# Patient Record
Sex: FEMALE | Race: WHITE | Employment: OTHER | URBAN - METROPOLITAN AREA
[De-identification: names, ages, dates, MRNs, and addresses within clinical notes are randomized per-mention and may not be internally consistent; named-entity substitution may affect disease eponyms.]

---

## 2017-06-28 ENCOUNTER — OFFICE VISIT (OUTPATIENT)
Dept: OPHTHALMOLOGY | Facility: CLINIC | Age: 77
End: 2017-06-28
Attending: OPHTHALMOLOGY
Payer: MEDICARE

## 2017-06-28 DIAGNOSIS — H27.8: ICD-10-CM

## 2017-06-28 DIAGNOSIS — H40.059 BORDERLINE GLAUCOMA WITH OCULAR HYPERTENSION: Primary | ICD-10-CM

## 2017-06-28 PROCEDURE — 92133 CPTRZD OPH DX IMG PST SGM ON: CPT | Mod: ZF | Performed by: OPHTHALMOLOGY

## 2017-06-28 PROCEDURE — 92250 FUNDUS PHOTOGRAPHY W/I&R: CPT | Mod: ZF | Performed by: OPHTHALMOLOGY

## 2017-06-28 PROCEDURE — 99213 OFFICE O/P EST LOW 20 MIN: CPT | Mod: ZF

## 2017-06-28 RX ORDER — LATANOPROST 50 UG/ML
1 SOLUTION/ DROPS OPHTHALMIC AT BEDTIME
Qty: 1 BOTTLE | Refills: 11 | Status: SHIPPED | OUTPATIENT
Start: 2017-06-28 | End: 2018-08-09

## 2017-06-28 ASSESSMENT — TONOMETRY
OD_IOP_MMHG: 25
OS_IOP_MMHG: 30
IOP_METHOD: APPLANATION

## 2017-06-28 ASSESSMENT — VISUAL ACUITY
METHOD: SNELLEN - LINEAR
OS_CC: 20/20
OD_CC: 20/15
CORRECTION_TYPE: GLASSES
OS_CC+: -1

## 2017-06-28 ASSESSMENT — CONF VISUAL FIELD
METHOD: COUNTING FINGERS
OS_NORMAL: 1
OD_NORMAL: 1

## 2017-06-28 ASSESSMENT — SLIT LAMP EXAM - LIDS
COMMENTS: NORMAL
COMMENTS: NORMAL

## 2017-06-28 ASSESSMENT — CUP TO DISC RATIO
OD_RATIO: 0.6
OS_RATIO: 0.6

## 2017-06-28 NOTE — NURSING NOTE
Chief Complaints and History of Present Illnesses   Patient presents with     Follow Up For     Pseudoexfoliation     HPI    Affected eye(s):  Both   Symptoms:     No blurred vision   No decreased vision         Do you have eye pain now?:  No      Comments:  Dionna JALLOH 7:56 AM June 28, 2017

## 2017-06-28 NOTE — PROGRESS NOTES
Pseudoexfoliation  Ocular hypertension-intraocular pressure fluctuates   Previously nl visual field and OCT  Cataracts-not symptomatic    HPI:  Selected laser trabeculoplasty (SLT) right eye (4/1/16)  No visual changes noted    Getting yearly visual field  alternating with yearly OCT    Testing today  Disc photos stable compared to 2012 both eyes     OCT retinal nerve fiber layer    Right eye: inferotemporal thinning and general thinning.     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, if latanoprost would use both eyes) - Prefers not to be on medications  Retired pharmacist    Impression  S/p Selected laser trabeculoplasty (SLT) right eye 4/1/16   intraocular pressure stable today in low 20s s/p Selected laser trabeculoplasty (SLT)    Early Pseudoexfoliation glaucoma right eye, 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 I personally obtained the chief complaint(s) and history of present illness. I confirmed and edited asnecessary the review of systems, past medical/surgical history, family history, social history, and examination findings as documented by others; and I examined the patient myself. I personally reviewed the relevant tests, images, and reports as documented above. I formulated and edited as necessary the assessment and plan  and discussed the findings and management plan with the patient and family.  - Rhiannon Christy MD 8:35 AM 6/28/2017

## 2017-06-28 NOTE — MR AVS SNAPSHOT
After Visit Summary   6/28/2017    Johnna Hurtado    MRN: [id number]           Patient Information     Date Of Birth          1940        Visit Information        Provider Department      6/28/2017 7:45 AM Rhiannon Christy MD [facility]        Today's Diagnoses     Borderline glaucoma with ocular hypertension    -  1    Exfoliation of lens capsule           Follow-ups after your visit        Follow-up notes from your care team     Return in about 7 weeks (around 8/16/2017) for iop check in 6-8 weeks.      Your next 10 appointments already scheduled     Aug 16, 2017  7:30 AM CDT   RETURN GLAUCOMA with Rhiannon Christy MD   Eye Clinic ([facility])    [address]  [address]  [address]  [facility] [address] [phone]              Who to contact     Please call your clinic at [phone] to:    Ask questions about your health    Make or cancel appointments    Discuss your medicines    Learn about your test results    Speak to your doctor   If you have compliments or concerns about an experience at your clinic, or if you wish to file a complaint, please contact [facility] Patient Relations at [phone] or email us at [email or web address]         Additional Information About Your Visit        MyChart Information     [employer] gives you secure access to your electronic health record. If you see a primary care provider, you can also send messages to your care team and make appointments. If you have questions, please call your primary care clinic.  If you do not have a primary care provider, please call [phone] and they will assist you.      [employer] is an electronic gateway that provides easy, online access to your medical records. With [employer], you can request a clinic appointment, read your test results, renew a prescription or communicate with your care team.     To access your existing account, please contact your  [facility] or call [phone] for assistance.        Care EveryWhere ID     This is your Care EveryWhere ID. This could be used by other organizations to access your [facility] medical records  [id number]         Blood Pressure from Last 3 Encounters:   10/27/16 108/75   09/23/15 106/72   08/22/14 113/80    Weight from Last 3 Encounters:   10/27/16 70.3 kg (155 lb)   09/23/15 69 kg (152 lb 1.3 oz)   08/22/14 70.8 kg (156 lb)              We Performed the Following     Fundus Photos OU (both eyes)     OCT Optic Nerve RNFL Spectralis OU (both eyes)          Today's Medication Changes          These changes are accurate as of: 6/28/17  8:57 AM.  If you have any questions, ask your nurse or doctor.               Start taking these medicines.        Dose/Directions    latanoprost 0.005 % ophthalmic solution   Commonly known as:  XALATAN   Used for:  Exfoliation of lens capsule   Started by:  Rhiannon Christy MD        Dose:  1 drop   Place 1 drop into both eyes At Bedtime   Quantity:  1 Bottle   Refills:  11            Where to get your medicines      These medications were sent to [address] [address]     Phone:  [phone]     latanoprost 0.005 % ophthalmic solution                Primary Care Provider Office Phone # Fax #    Dominic Brad Hopkins [phone]       [address] [address]        Equal Access to Services     FARZANEH MOROCHO AH: Hadii myron ku hadasho Soomaali, waaxda luqadaha, qaybta kaalmada yuriegyada, waxpierce melissa bianchi. So [facility] [phone].    ATENCIÓN: Si nicolasala rohith, tiene a thomason disposición servicios gratuitos de asistencia lingüística. Llame al [phone].    We comply with applicable federal civil rights laws and [place] laws. We do not discriminate on the basis of race, color, national origin, age, disability sex,  sexual orientation or gender identity.            Thank you!     Thank you for choosing EYE CLINIC  for your care. Our goal is always to provide you with excellent care. Hearing back from our patients is one way we can continue to improve our services. Please take a few minutes to complete the written survey that you may receive in the mail after your visit with us. Thank you!             Your Updated Medication List - Protect others around you: Learn how to safely use, store and throw away your medicines at www.disposemymeds.org.          This list is accurate as of: 6/28/17  8:57 AM.  Always use your most recent med list.                   Brand Name Dispense Instructions for use Diagnosis    latanoprost 0.005 % ophthalmic solution    XALATAN    1 Bottle    Place 1 drop into both eyes At Bedtime    Exfoliation of lens capsule       UNABLE TO FIND      MEDICATION NAME: Cosmos, cocoa supplement and multivitamin studies., Take 2 pills of cocoa extract and 1 multivitamins.

## 2017-08-16 ENCOUNTER — OFFICE VISIT (OUTPATIENT)
Dept: OPHTHALMOLOGY | Facility: CLINIC | Age: 77
End: 2017-08-16
Attending: OPHTHALMOLOGY
Payer: MEDICARE

## 2017-08-16 DIAGNOSIS — H40.052 BORDERLINE GLAUCOMA WITH OCULAR HYPERTENSION, LEFT: Primary | ICD-10-CM

## 2017-08-16 DIAGNOSIS — H40.1412: ICD-10-CM

## 2017-08-16 DIAGNOSIS — H26.8 PXF (PSEUDOEXFOLIATION OF LENS CAPSULE): ICD-10-CM

## 2017-08-16 PROCEDURE — 99213 OFFICE O/P EST LOW 20 MIN: CPT | Mod: ZF

## 2017-08-16 ASSESSMENT — REFRACTION_WEARINGRX
OS_SPHERE: -3.00
OD_CYLINDER: +1.00
OS_AXIS: 006
OS_CYLINDER: +1.00
OD_ADD: +2.50
OD_SPHERE: -3.25
OS_ADD: +2.50
OD_AXIS: 025

## 2017-08-16 ASSESSMENT — TONOMETRY
OS_IOP_MMHG: 23
OD_IOP_MMHG: 19
OD_IOP_MMHG: 23
IOP_METHOD: APPLANATION
IOP_METHOD: APPLANATION
OS_IOP_MMHG: 21

## 2017-08-16 ASSESSMENT — CUP TO DISC RATIO
OS_RATIO: 0.6
OD_RATIO: 0.6

## 2017-08-16 ASSESSMENT — SLIT LAMP EXAM - LIDS
COMMENTS: NORMAL
COMMENTS: NORMAL

## 2017-08-16 ASSESSMENT — CONF VISUAL FIELD
METHOD: COUNTING FINGERS
OD_NORMAL: 1
OS_NORMAL: 1

## 2017-08-16 ASSESSMENT — VISUAL ACUITY
CORRECTION_TYPE: GLASSES
OD_CC: 20/15
METHOD: SNELLEN - LINEAR
OS_CC: 20/20

## 2017-08-16 NOTE — MR AVS SNAPSHOT
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      Your next 10 appointments already scheduled     Dec 15, 2017  9:45 AM CST   VISUAL FIELD with [facility] EYE VISUAL FIELD   Eye Clinic ([facility])    [address]  [address]   [address] [address] [place] [address]   [phone]            Dec 15, 2017 10:15 AM CST   RETURN GLAUCOMA with Rhiannon Christy MD   Eye Clinic ([facility])    [address]  [address]   [address] [address]  [facility] [address]   [phone]              Who to contact     Please call your clinic at [phone] to:    Ask questions about your health    Make or cancel appointments    Discuss your medicines    Learn about your test results    Speak to your doctor   If you have compliments or concerns about an experience at your clinic, or if you wish to file a complaint, please contact [facility] Patient Relations at [phone] or email us at [email or web address]         Additional Information About Your Visit        MyChart Information     [employer] gives you secure access to your electronic health record. If you see a primary care provider, you can also send messages to your care team and make appointments. If you have questions, please call your primary care clinic.  If you do not have a  primary care provider, please call [phone] and they will assist you.      [employer] is an electronic gateway that provides easy, online access to your medical records. With [employer], you can request a clinic appointment, read your test results, renew a prescription or communicate with your care team.     To access your existing account, please contact your [facility] or call [phone] for assistance.        Care EveryWhere ID     This is your Care EveryWhere ID. This could be used by other organizations to access your [facility] medical records  [id number]         Blood Pressure from Last 3 Encounters:   10/27/16 108/75   09/23/15 106/72   08/22/14 113/80    Weight from Last 3 Encounters:   10/27/16 70.3 kg (155 lb)   09/23/15 69 kg (152 lb 1.3 oz)   08/22/14 70.8 kg (156 lb)              Today, you had the following     No orders found for display       Primary Care Provider Office Phone # Fax #    Dominic Hopkins [phone]       [address] [place] [address]        Equal Access to Services     [facility] Hospital[facility] : Hadii myron dempsey hadasho Soomaali, waaxda luqadaha, qaybta kaalmada adegeorgetteyaari, isela wade . So [facility] [phone].    ATENCIÓN: Si habla español, tiene a thomason disposición servicios gratuitos de asistencia lingüística. [facility] [phone].    We comply with applicable federal civil rights laws and [place] laws. We do not discriminate on the basis of race, color, national origin, age, disability sex, sexual orientation or gender identity.            Thank you!     Thank you for choosing EYE CLINIC  for your care. Our goal is always to provide you with excellent care. Hearing back from our patients is one way we can continue to improve our services. Please take a few minutes to complete the written survey that you may receive in the mail after your visit with us. Thank you!             Your Updated Medication List -  Protect others around you: Learn how to safely use, store and throw away your medicines at www.disposemymeds.org.          This list is accurate as of: 8/16/17  8:21 AM.  Always use your most recent med list.                   Brand Name Dispense Instructions for use Diagnosis    latanoprost 0.005 % ophthalmic solution    XALATAN    1 Bottle    Place 1 drop into both eyes At Bedtime    Exfoliation of lens capsule       UNABLE TO FIND      MEDICATION NAME: Cosmos, cocoa supplement and multivitamin studies., Take 2 pills of cocoa extract and 1 multivitamins.

## 2017-08-16 NOTE — NURSING NOTE
Chief Complaints and History of Present Illnesses   Patient presents with     Follow Up For     Borderline glaucoma with ocular hypertension      HPI    Affected eye(s):  Both   Symptoms:        Frequency:  Constant       Do you have eye pain now?:  No      Comments:  States va is the same since last visit  No red watery or dry  Florinda Hunter COT 7:35 AM August 16, 2017

## 2017-08-16 NOTE — PROGRESS NOTES
Pseudoexfoliation  Ocular hypertension-intraocular pressure fluctuates   Previously nl visual field and OCT  Cataracts-not symptomatic    HPI:  Selected laser trabeculoplasty (SLT) right eye (4/1/16)  No visual changes noted. Eyes comfortable on latanoprost.     Getting yearly visual field  alternating with yearly OCT    Previous testing  Disc photos stable compared to 2012 both eyes     OCT retinal nerve fiber layer 6/28/17   Right eye: inferotemporal thinning and general thinning.     There has been slow progression (thinning over 5 years) including worsening since selective laser trabeculoplasty    Left eye: Normal, stable    Exam  Thin area of disc IT right eye with continued presence of possible resolving Drance hemorrhage in that area - stable compared to 6/28    Has kelechi eyes, was started on latanoprost - Prefers not to be on medications  Retired pharmacist      Impression  S/p Selected laser trabeculoplasty (SLT) right eye 4/1/16   Intraocular pressure upper teens/low 20s today (IOP was 25/30 prior to starting latanoprost) s/p starting latanoprost both eyes 6/28/17 and s/p Selected laser trabeculoplasty (SLT)    Moderate Pseudoexfoliation glaucoma right eye, 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 I personally obtained the chief complaint(s) and history of present illness. I confirmed and edited asnecessary the review of systems, past medical/surgical history, family history, social history, and examination findings as documented by others; and I examined the patient myself. I personally reviewed the  relevant tests, images, and reports as documented above. I formulated and edited as necessary the assessment and plan and discussed the findings and management plan with the patient and family.  - Rhiannon Christy MD 8:10 AM 8/16/2017

## 2017-09-29 DIAGNOSIS — Z85.3 HISTORY OF BREAST CANCER: Primary | ICD-10-CM

## 2017-09-29 DIAGNOSIS — Z90.12 H/O MASTECTOMY, LEFT: ICD-10-CM

## 2017-12-12 NOTE — PROGRESS NOTES
CHIEF COMPLAINT:  Medicare annual wellness visit, subsequent.      HISTORY OF PRESENT ILLNESS:  Johnna is a 77-year-old here for the above.  Overall, she is doing very well.  She has 2 questions.      The first is that there is a small mole behind her right ear and one on her left chest wall.  Neither cause any pain or irritating.  The second question is regarding the new shingles vaccine and we had a good conversation about that.       ACTIVE MEDICAL PROBLEMS:  Reviewed.      CURRENT MEDICATIONS:  Consists only of latanoprost (Xalatan) drops as she has pre-glaucoma.  She is followed very closely every 6 months by Dr. Rhiannon Christy in Ophthalmology at the [facility].        FAMILY, SOCIAL AND PAST SURGICAL HISTORIES:  Unchanged.  From a social history standpoint, she has been  now for about 4-1/2 years and actually is enjoying life.  She grew up as an only child and is starting to enjoy her time alone.  She has a 9-year-old dog who she is very close to.  No changes with her family or past surgical histories.        MEDICARE QUESTIONS:  No problems at all with any activities of daily living.  She has had no falls at home in the last year.  No symptoms of depression in the last 2 weeks.  Absolutely no memory loss.  Of note, her son was concerned about her memory last year.  In hindsight, she feels that he was going through some things himself and wanted her to get checked out.  I administered a Mini-Mental Status Exam (MMSE) and her score was 30.  She had no concerns then and she has no concerns now about her memory.      HEALTHCARE MAINTENANCE:  She wears glasses and has pre-glaucoma as mentioned.  She sees Dr. Christy every 6 months.  Hearing reveals no changes.  Dental care is completely up to date.  Blood pressure 125/83.  Weight is up 7 pounds from the last time I saw her a little over a year ago.  She is doing more cooking at home and therefore is putting on some weight.  However, she is not that worried  about it and is happy.  BMI 31.95.  From an immunization standpoint, she had a flu shot this fall.  She would like to get the new shingles vaccine and I will recommend that in 2018 once it becomes available.  She understands it is a 2-shot sequence and will need to get that at her pharmacy as it is not covered under Medicare part B.  Up to date with a tetanus shot.  She had a mammogram of the right breast in 10/2017 (she had a left mastectomy due to breast cancer).  This was normal.  Colonoscopy was last performed in 2015 and they recommended a 5-year followup.  However, she will be 80 at that point so we will likely do a Cologuard test.  Lipid panel done a year ago was absolutely perfect.  Glucose is normal.  Therefore, she and I elected not to do any blood work this year as it is simply not necessary.      REVIEW OF SYSTEMS:  As above.      OBJECTIVE:  Johnna is in absolutely no distress.  Affect is upbeat.  She is completely alert and oriented x3.  /83 with a heart rate of 75.  Temperature is 98.  She is 4 feet 11.75 inches in height and weighs 163 with a BMI of 31.95.  O2 sats are 98% on room air.   HEENT:  Head is normocephalic, atraumatic.  Ears are free of any cerumen.  The TMs look fine.  There is no pain with palpation of the frontal or maxillary sinuses.  Eyes are grossly normal.  Nose is free of any congestion or discharge.  Teeth in good repair.  Mucous membranes are moist.  Throat looks benign.  Neck is supple without adenopathy or thyromegaly.  No carotid bruits are heard, no JVD.   BACK:  Smooth and straight.  No pain to percussion.  No CVA tenderness.     LUNGS:  Clear to auscultation bilaterally.     HEART:  Regular rate and rhythm without murmur.  Ankles are free of any significant edema.  She has excellent dorsalis pedis pulses bilaterally.     SKIN:  There is a bit of a blue nevus on her left anterior chest wall.  It looks benign.  It might just be a blood vessel.  We will simply keep an  eye on it at this point.  Behind her right ear is what appears to be a seborrheic keratosis.  It is a relatively light brown, raised slightly, clear border and nothing about it looks ominous.  We will simply watch that for now as well.      ASSESSMENT/PLAN:   1.  Medicare annual wellness visit, subsequent.   2.  Up to date with healthcare maintenance strategies.   3.  Shingles vaccine in 2018.   4.  History of breast cancer, up to date with breast cancer screening.   5.  Declining DEXA scan for bone density purposes as she has no interest in taking one of the bisphosphonate medications.   6.  Borderline glaucoma, followed closely by Ophthalmology.  Continue with current visits as she has been doing.  Call with any problems or questions.     Dominic Hopkins MD  [facility]

## 2017-12-14 DIAGNOSIS — H40.1412: Primary | ICD-10-CM

## 2017-12-15 ENCOUNTER — OFFICE VISIT (OUTPATIENT)
Dept: OPHTHALMOLOGY | Facility: CLINIC | Age: 77
End: 2017-12-15
Attending: OPHTHALMOLOGY
Payer: MEDICARE

## 2017-12-15 DIAGNOSIS — H40.1412: ICD-10-CM

## 2017-12-15 PROCEDURE — 99213 OFFICE O/P EST LOW 20 MIN: CPT | Mod: ZF

## 2017-12-15 PROCEDURE — 92083 EXTENDED VISUAL FIELD XM: CPT | Mod: ZF | Performed by: OPHTHALMOLOGY

## 2017-12-15 ASSESSMENT — REFRACTION_WEARINGRX
OD_CYLINDER: +1.00
OD_ADD: +2.50
OD_AXIS: 025
OS_ADD: +2.50
OS_AXIS: 006
OS_CYLINDER: +1.00
OD_SPHERE: -3.25
OS_SPHERE: -3.00

## 2017-12-15 ASSESSMENT — CONF VISUAL FIELD
OD_NORMAL: 1
OS_NORMAL: 1
METHOD: COUNTING FINGERS

## 2017-12-15 ASSESSMENT — VISUAL ACUITY
OD_CC: 20/20
CORRECTION_TYPE: GLASSES
OS_CC: 20/20
METHOD: SNELLEN - LINEAR
METHOD_MR: DECLINES MR TODAY

## 2017-12-15 ASSESSMENT — TONOMETRY
IOP_METHOD: APPLANATION
IOP_METHOD: APPLANATION
OD_IOP_MMHG: 21
OD_IOP_MMHG: 18
OS_IOP_MMHG: 19
OS_IOP_MMHG: 20

## 2017-12-15 ASSESSMENT — CUP TO DISC RATIO
OS_RATIO: 0.6
OD_RATIO: 0.6

## 2017-12-15 ASSESSMENT — SLIT LAMP EXAM - LIDS
COMMENTS: NORMAL
COMMENTS: NORMAL

## 2017-12-15 NOTE — PROGRESS NOTES
Pseudoexfoliation  Ocular hypertension-intraocular pressure fluctuates   Previously nl visual field and OCT  Cataracts-not symptomatic    HPI:  Selected laser trabeculoplasty (SLT) right eye (4/1/16)  No visual changes noted. Eyes comfortable on latanoprost.     Getting yearly visual field  alternating with yearly OCT    Testing today  Octopus visual field 24-2 12/15/17   OD inferior arcuate/nasal step   OS non specific changes    Previous testing  Disc photos stable compared to 2012 both eyes     OCT retinal nerve fiber layer 6/28/17   Right eye: inferotemporal thinning and general thinning.     There has been slow progression (thinning over 5 years) including worsening since selective laser trabeculoplasty    Left eye: Normal, stable    Exam  Thin area of disc IT right eye with continued presence of possible resolving Drance hemorrhage in that area - stable compared to 6/28    Has kelechi eyes, was started on latanoprost - Prefers not to be on medications  Retired pharmacist      Impression  S/p Selected laser trabeculoplasty (SLT) right eye 4/1/16   Intraocular pressure upper teens/low 20s today (IOP was 25/30 prior to starting latanoprost) s/p starting latanoprost both eyes 6/28/17 and s/p Selected laser trabeculoplasty (SLT)    Moderate Pseudoexfoliation glaucoma right eye, 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   intraocular pressure not controlled and OCT and cupping worsening right eye, intraocular pressure in high risk range left eye     Plan  Continue latanoprost both eyes at bedtime  May need Selected laser trabeculoplasty (SLT) left eye at some point in the future  Return to clinic 6 months with OCT retinal nerve fiber layer       Mike Hutson MD  PGY3, Dept of Ophthalmology  Pager [phone]    Attending Physician Attestation:  Complete documentation of historical and exam elements from today's encounter can be found in the full encounter summary report (not reduplicated in this progress note). I personally obtained the  chief complaint(s) and history of present illness. I confirmed and edited asnecessary the review of systems, past medical/surgical history, family history, social history, and examination findings as documented by others; and I examined the patient myself. I personally reviewed the relevant tests, images, and reports as documented above. I formulated and edited as necessary the assessment and plan and discussed the findings and management plan with the patient and family.  - Rhiannon Christy MD 11:29 AM 12/15/2017

## 2017-12-15 NOTE — NURSING NOTE
Chief Complaints and History of Present Illnesses   Patient presents with     Follow Up For     Borderline glaucoma with ocular hypertension     HPI    Affected eye(s):  Both   Symptoms:        Frequency:  Constant       Do you have eye pain now?:  No      Comments:  States va is the same since last visit  No red watery or dry  Florinda Hunter COT 10:18 AM December 15, 2017

## 2017-12-15 NOTE — MR AVS SNAPSHOT
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      Your next 10 appointments already scheduled     Dec 28, 2017  8:00 AM CST   PHYSICAL with Dominic Hopkins MD   [facility]    [place] [id number] [facility]  [facility] [address]   [phone]            Noam 15, 2018  9:00 AM CDT   RETURN GLAUCOMA with hRiannon Christy MD   Eye Clinic ([facility])    [address]  [address]  9[address]  [facility] [id number] [phone]              Who to contact     Please call your clinic at [phone] to:    Ask questions about your health    Make or cancel appointments    Discuss your medicines    Learn about your test results    Speak to your doctor   If you have compliments or concerns about an experience at your clinic, or if you wish to file a complaint, please contact [facility] Patient Relations at [phone] or email us at [email or web address]         Additional Information About Your Visit        MyChart Information     K[employer] gives you secure access to your electronic health record. If you see a primary care provider, you can also send messages to your care team and make appointments. If you have questions, please call your primary care clinic.  If you do not have a primary care provider, please call [phone] and they will assist you.      [employer] is an electronic gateway that provides easy, online access to your medical  records. With [employer], you can request a clinic appointment, read your test results, renew a prescription or communicate with your care team.     To access your existing account, please contact your [facility] or call [phone] for assistance.        Care EveryWhere ID     This is your Care EveryWhere ID. This could be used by other organizations to access your [place] medical records  [id number]         Blood Pressure from Last 3 Encounters:   10/27/16 108/75   09/23/15 106/72   08/22/14 113/80    Weight from Last 3 Encounters:   10/27/16 70.3 kg (155 lb)   09/23/15 69 kg (152 lb 1.3 oz)   08/22/14 70.8 kg (156 lb)              We Performed the Following     OVF 24-2 Dynamic OU        Primary Care Provider Office Phone # Fax #    Dominic Hopkins [phone]       [address] [place] [address]        Equal Access to Services     FARZANEH MOROHCO : Hadii aad ku hadasho Soomaali, waaxda luqadaha, qaybta kaalmada adeegyada, waxay ankushin hannah wade . So [facility] [phone].    ATENCIÓN: Si habla español, tiene a thomason disposición servicios gratuitos de asistencia lingüística. Llame al [phone].    We comply with applicable federal civil rights laws and [place] laws. We do not discriminate on the basis of race, color, national origin, age, disability, sex, sexual orientation, or gender identity.            Thank you!     Thank you for choosing EYE CLINIC  for your care. Our goal is always to provide you with excellent care. Hearing back from our patients is one way we can continue to improve our services. Please take a few minutes to complete the written survey that you may receive in the mail after your visit with us. Thank you!             Your Updated Medication List - Protect others around you: Learn how to safely use, store and throw away your medicines at www.disposemymeds.org.          This list is accurate as of: 12/15/17 11:37 AM.   Always use your most recent med list.                   Brand Name Dispense Instructions for use Diagnosis    latanoprost 0.005 % ophthalmic solution    XALATAN    1 Bottle    Place 1 drop into both eyes At Bedtime    Exfoliation of lens capsule       UNABLE TO FIND      MEDICATION NAME: Cosmos, cocoa supplement and multivitamin studies., Take 2 pills of cocoa extract and 1 multivitamins.

## 2017-12-28 ENCOUNTER — OFFICE VISIT (OUTPATIENT)
Dept: FAMILY MEDICINE | Facility: CLINIC | Age: 77
End: 2017-12-28
Payer: MEDICARE

## 2017-12-28 VITALS
BODY MASS INDEX: 31.86 KG/M2 | SYSTOLIC BLOOD PRESSURE: 125 MMHG | OXYGEN SATURATION: 98 % | DIASTOLIC BLOOD PRESSURE: 83 MMHG | HEART RATE: 75 BPM | HEIGHT: 60 IN | WEIGHT: 162.25 LBS | TEMPERATURE: 98 F

## 2017-12-28 DIAGNOSIS — Z23 NEEDS FLU SHOT: ICD-10-CM

## 2017-12-28 DIAGNOSIS — Z00.00 MEDICARE ANNUAL WELLNESS VISIT, SUBSEQUENT: Primary | ICD-10-CM

## 2017-12-28 DIAGNOSIS — Z13.1 SCREENING FOR DIABETES MELLITUS: ICD-10-CM

## 2017-12-28 DIAGNOSIS — Z13.220 SCREENING CHOLESTEROL LEVEL: ICD-10-CM

## 2017-12-28 DIAGNOSIS — Z13.820 SCREENING FOR OSTEOPOROSIS: ICD-10-CM

## 2017-12-28 ASSESSMENT — PAIN SCALES - GENERAL: PAINLEVEL: NO PAIN (0)

## 2017-12-28 NOTE — NURSING NOTE
"77 year old  Chief Complaint   Patient presents with     Physical       Blood pressure 125/83, pulse 75, temperature 98  F (36.7  C), temperature source Temporal, height 4' 11.75\" (151.8 cm), weight 162 lb 4 oz (73.6 kg), SpO2 98 %. Body mass index is 31.95 kg/(m^2).  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        Social History   Substance Use Topics     Smoking status: Never Smoker     Smokeless tobacco: Never Used     Alcohol use 0.0 oz/week     0 Standard drinks or equivalent per week      Comment: a glass of wine 4 times a week       Health Maintenance Due   Topic Date Due     ADVANCE DIRECTIVE PLANNING Q5 YRS  12/10/1993     DEXA Q3 YR  04/11/2010     INFLUENZA VACCINE (SYSTEM ASSIGNED)  09/01/2017       No results found for: PAP      December 28, 2017 7:59 AM    "

## 2017-12-28 NOTE — MR AVS SNAPSHOT
After Visit Summary   12/28/2017    Johnna Hurtado    MRN: [id number]           Patient Information     Date Of Birth          1940        Visit Information        Provider Department      12/28/2017 8:00 AM Dominic Hopkins MD [facility]        Today's Diagnoses     Medicare annual wellness visit, subsequent    -  1    Needs flu shot        Screening for osteoporosis        Screening cholesterol level        Screening for diabetes mellitus          Care Instructions      Preventive Health Recommendations  Female Ages 65 +    Yearly exam:     See your health care provider every year in order to  o Review health changes.   o Discuss preventive care.    o Review your medicines if your doctor has prescribed any.      You no longer need a yearly Pap test unless you've had an abnormal Pap test in the past 10 years. If you have vaginal symptoms, such as bleeding or discharge, be sure to talk with your provider about a Pap test.      Every 1 to 2 years, have a mammogram.  If you are over 69, talk with your health care provider about whether or not you want to continue having screening mammograms.      Every 10 years, have a colonoscopy. Or, have a yearly FIT test (stool test). These exams will check for colon cancer.       Have a cholesterol test every 5 years, or more often if your doctor advises it.       Have a diabetes test (fasting glucose) every three years. If you are at risk for diabetes, you should have this test more often.       At age 65, have a bone density scan (DEXA) to check for osteoporosis (brittle bone disease).    Shots:    Get a flu shot each year.    Get a tetanus shot every 10 years.    Talk to your doctor about your pneumonia vaccines. There are now two you should receive - Pneumovax (PPSV 23) and Prevnar (PCV 13).    Talk to your doctor about the shingles vaccine.    Talk to your doctor about the hepatitis B vaccine.    Nutrition:     Eat at least 5 servings of fruits  and vegetables each day.      Eat whole-grain bread, whole-wheat pasta and brown rice instead of white grains and rice.      Talk to your provider about Calcium and Vitamin D.     Lifestyle    Exercise at least 150 minutes a week (30 minutes a day, 5 days a week). This will help you control your weight and prevent disease.      Limit alcohol to one drink per day.      No smoking.       Wear sunscreen to prevent skin cancer.       See your dentist twice a year for an exam and cleaning.      See your eye doctor every 1 to 2 years to screen for conditions such as glaucoma, macular degeneration, cataracts, etc           Follow-ups after your visit        Follow-up notes from your care team     Return in about 1 year (around 12/28/2018) for Medicare Annual Wellness Visit.      Your next 10 appointments already scheduled     Noam 15, 2018  9:00 AM CDT   RETURN GLAUCOMA with Rhiannon Christy MD   Eye Clinic ([facility])    [address]  [address]  9th [address] Clin [address]  [facility] [address] [phone]              Who to contact     Please call your clinic at [phone] to:    Ask questions about your health    Make or cancel appointments    Discuss your medicines    Learn about your test results    Speak to your doctor   If you have compliments or concerns about an experience at your clinic, or if you wish to file a complaint, please contact [facility] Patient Relations at [phone] or email us at [email or web address]         Additional Information About Your Visit        [employer] Information     [employer] gives you secure access to your electronic health record. If you see a primary care provider, you can also send messages to your care team and make appointments. If you have questions, please call your primary care clinic.  If you do not have a primary care provider, please call [phone] and they will assist you.      Nu[employer] is an electronic gateway  "that provides easy, online access to your medical records. With [employer], you can request a clinic appointment, read your test results, renew a prescription or communicate with your care team.     To access your existing account, please contact your [facility] or call [phone] for assistance.        Care EveryWhere ID     This is your Care EveryWhere ID. This could be used by other organizations to access your [place] medical records  [id number]        Your Vitals Were     Pulse Temperature Height Pulse Oximetry BMI (Body Mass Index)       75 98  F (36.7  C) (Temporal) 4' 11.75\" (151.8 cm) 98% 31.95 kg/m2        Blood Pressure from Last 3 Encounters:   12/28/17 125/83   10/27/16 108/75   09/23/15 106/72    Weight from Last 3 Encounters:   12/28/17 162 lb 4 oz (73.6 kg)   10/27/16 155 lb (70.3 kg)   09/23/15 152 lb 1.3 oz (69 kg)              Today, you had the following     No orders found for display       Primary Care Provider Office Phone # Fax #    Dominic Hopkins [phone]       [address] [place] [address]        Equal Access to Services     FARZANEH MOROCHO : Hadii myron dempsey hadasho Soomaali, waaxda luqadaha, qaybta kaalmada adeegyada, isela bianchi. So [facility] [phone].    ATENCIÓN: Si habla español, tiene a thomason disposición servicios gratuitos de asistencia lingüística. Llame al [phone].    We comply with applicable federal civil rights laws and [place] laws. We do not discriminate on the basis of race, color, national origin, age, disability, sex, sexual orientation, or gender identity.            Thank you!     Thank you for choosing [facility]  for your care. Our goal is always to provide you with excellent care. Hearing back from our patients is one way we can continue to improve our services. Please take a few minutes to complete the written survey that you may receive in the mail after your visit " with us. Thank you!             Your Updated Medication List - Protect others around you: Learn how to safely use, store and throw away your medicines at www.disposemymeds.org.          This list is accurate as of: 12/28/17  9:25 AM.  Always use your most recent med list.                   Brand Name Dispense Instructions for use Diagnosis    latanoprost 0.005 % ophthalmic solution    XALATAN    1 Bottle    Place 1 drop into both eyes At Bedtime    Exfoliation of lens capsule       UNABLE TO FIND      MEDICATION NAME: Cosmos, cocoa supplement and multivitamin studies., Take 2 pills of cocoa extract and 1 multivitamins.

## 2018-04-11 DIAGNOSIS — Z23 NEED FOR VACCINATION: Primary | ICD-10-CM

## 2018-06-15 ENCOUNTER — OFFICE VISIT (OUTPATIENT)
Dept: OPHTHALMOLOGY | Facility: CLINIC | Age: 78
End: 2018-06-15
Attending: OPHTHALMOLOGY
Payer: MEDICARE

## 2018-06-15 DIAGNOSIS — H40.1412: ICD-10-CM

## 2018-06-15 DIAGNOSIS — H40.059 BORDERLINE GLAUCOMA WITH OCULAR HYPERTENSION: Primary | ICD-10-CM

## 2018-06-15 PROCEDURE — 92133 CPTRZD OPH DX IMG PST SGM ON: CPT | Mod: ZF | Performed by: OPHTHALMOLOGY

## 2018-06-15 PROCEDURE — G0463 HOSPITAL OUTPT CLINIC VISIT: HCPCS | Mod: ZF

## 2018-06-15 ASSESSMENT — TONOMETRY
OS_IOP_MMHG: 22
IOP_METHOD: APPLANATION
OD_IOP_MMHG: 21

## 2018-06-15 ASSESSMENT — REFRACTION_WEARINGRX
OD_AXIS: 025
OS_CYLINDER: +1.00
OD_CYLINDER: +1.00
OD_SPHERE: -3.25
OS_SPHERE: -3.00
OD_ADD: +2.50
OS_ADD: +2.50
SPECS_TYPE: PAL
OS_AXIS: 006

## 2018-06-15 ASSESSMENT — CUP TO DISC RATIO
OS_RATIO: 0.6
OD_RATIO: 0.6

## 2018-06-15 ASSESSMENT — VISUAL ACUITY
CORRECTION_TYPE: GLASSES
OD_CC: 20/20
OS_CC+: -3
METHOD: SNELLEN - LINEAR
OS_CC: 20/20

## 2018-06-15 ASSESSMENT — CONF VISUAL FIELD
METHOD: COUNTING FINGERS
OS_NORMAL: 1
OD_NORMAL: 1

## 2018-06-15 ASSESSMENT — SLIT LAMP EXAM - LIDS
COMMENTS: NORMAL
COMMENTS: NORMAL

## 2018-06-15 NOTE — MR AVS SNAPSHOT
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      Your next 10 appointments already scheduled     Dec 12, 2018 12:15 PM CST   RETURN GLAUCOMA with Rhiannon Christy MD   Eye Clinic ([facility])    [address]  [facility] [id number] [phone]              Who to contact     Please call your clinic at [phone] to:    Ask questions about your health    Make or cancel appointments    Discuss your medicines    Learn about your test results    Speak to your doctor            Additional Information About Your Visit        [employer] Information     emids gives you secure access to your electronic health record. If you see a primary care provider, you can also send messages to your care team and make appointments. If you have questions, please call your primary care clinic.  If you do not have a primary care provider, please call [phone] and they will assist you.      emids is an electronic gateway that provides easy, online access to your medical records. With emids, you can request a clinic appointment, read your test results, renew a prescription or communicate with your care team.     To access your existing account, please contact your [facility] or call [phone] for assistance.        Care EveryWhere ID     This is your Care EveryWhere ID. This could be used by other organizations to  access your [place] medical records  [id number]         Blood Pressure from Last 3 Encounters:   12/28/17 125/83   10/27/16 108/75   09/23/15 106/72    Weight from Last 3 Encounters:   12/28/17 73.6 kg (162 lb 4 oz)   10/27/16 70.3 kg (155 lb)   09/23/15 69 kg (152 lb 1.3 oz)              We Performed the Following     OCT Optic Nerve RNFL Spectralis OU (both eyes)        Primary Care Provider Office Phone # Fax #    Dominic Hopkins [phone] [address] [place] [address]        Equal Access to Services     [facility] Hospital[facility] : Hadii myron espinalo Sosheila, waaxda luqadaha, qaybta kaalmada mac, isela bianchi. So [facility] [phone].    ATENCIÓN: Si habla español, tiene a thomason disposición servicios gratuitos de asistencia lingüística. LlMer[facility] [phone].    We comply with applicable federal civil rights laws and [place] laws. We do not discriminate on the basis of race, color, national origin, age, disability, sex, sexual orientation, or gender identity.            Thank you!     Thank you for choosing EYE CLINIC  for your care. Our goal is always to provide you with excellent care. Hearing back from our patients is one way we can continue to improve our services. Please take a few minutes to complete the written survey that you may receive in the mail after your visit with us. Thank you!             Your Updated Medication List - Protect others around you: Learn how to safely use, store and throw away your medicines at www.disposemymeds.org.          This list is accurate as of 6/15/18 11:11 AM.  Always use your most recent med list.                   Brand Name Dispense Instructions for use Diagnosis    latanoprost 0.005 % ophthalmic solution    XALATAN    1 Bottle    Place 1 drop into both eyes At Bedtime    Exfoliation of lens capsule       UNABLE TO FIND      MEDICATION NAME: Cosmos, cocoa supplement and multivitamin studies., Take 2 pills  of cocoa extract and 1 multivitamins.

## 2018-06-15 NOTE — PROGRESS NOTES
Pseudoexfoliation  Ocular hypertension-intraocular pressure fluctuates   Previously nl visual field and OCT  Cataracts-not symptomatic    HPI:  Selected laser trabeculoplasty (SLT) right eye (4/1/16)  No visual changes noted. Eyes comfortable on latanoprost.     Getting yearly visual field  alternating with yearly OCT    Testing   Octopus visual field 24-2 12/15/17   OD inferior arcuate/nasal step   OS non specific changes    OCT retinal nerve fiber layer 06/15/18   Right eye: inferotemporal thinning and general thinning.     There has been slow progression (thinning over 5 years) including worsening since selective laser trabeculoplasty    Left eye: Slow progression superiorly, nasally, and inferonasally    Previous testing  Disc photos stable compared to 2012 both eyes       Exam  Has kelechi eyes, was started on latanoprost - Prefers not to be on medications  Retired pharmacist      Impression  S/p Selected laser trabeculoplasty (SLT) right eye 4/1/16   Intraocular pressure upper teens/low 20s today (IOP was 25/30 prior to starting latanoprost) s/p starting latanoprost both eyes 6/28/17 and s/p Selected laser trabeculoplasty (SLT)    Moderate Pseudoexfoliation glaucoma right eye, 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 I personally obtained the chief complaint(s) and history of present illness. I confirmed and edited asnecessary the review of systems, past medical/surgical history, family history, social history, and examination findings as documented by others; and I examined the patient myself. I  personally reviewed the relevant tests, images, and reports as documented above. I formulated and edited as necessary the assessment and plan and discussed the findings and management plan with the patient and family.  - Rhiannon Christy MD 10:52 AM 6/15/2018

## 2018-08-09 DIAGNOSIS — H27.8: ICD-10-CM

## 2018-08-09 RX ORDER — LATANOPROST 50 UG/ML
SOLUTION/ DROPS OPHTHALMIC
Qty: 7.5 ML | Refills: 2 | Status: SHIPPED | OUTPATIENT
Start: 2018-08-09 | End: 2019-06-12

## 2018-08-09 NOTE — TELEPHONE ENCOUNTER
Medication: xalatan      Last Written Prescription Date:  6/28/17  Last Fill Quantity: 1,   # refills: 11    Last Office Visit: 6/15/18  Future Office visit: 12/12/18

## 2018-09-19 ENCOUNTER — NURSE TRIAGE (OUTPATIENT)
Dept: NURSING | Facility: CLINIC | Age: 78
End: 2018-09-19

## 2018-09-19 NOTE — TELEPHONE ENCOUNTER
I connected the patient with scheduling, for an appointment. Diarrhea continues despite taking antidiarrheal pills.  Marie West RN-[facility] Advisors      Reason for Disposition    [1] SEVERE diarrhea (e.g., 7 or more times / day more than normal) AND [2]  age > 60 years    Additional Information    Negative: Shock suspected (e.g., cold/pale/clammy skin, too weak to stand, low BP, rapid pulse)    Negative: Difficult to awaken or acting confused  (e.g., disoriented, slurred speech)    Negative: Sounds like a life-threatening emergency to the triager    Negative: Vomiting also present and worse than the diarrhea    Negative: [1] Blood in stool AND [2] without diarrhea    Negative: [1] SEVERE abdominal pain (e.g., excruciating) AND [2] present > 1 hour    Negative: [1] SEVERE abdominal pain AND [2] age > 60    Negative: [1] Blood in the stool AND [2] moderate or large amount of blood    Negative: Black or tarry bowel movements  (Exception: chronic-unchanged  black-grey bowel movements AND is taking iron pills or Pepto-bismol)    Negative: [1] Drinking very little AND [2] dehydration suspected (e.g., no urine > 12 hours, very dry mouth, very lightheaded)    Negative: Patient sounds very sick or weak to the triager    Protocols used: DIARRHEA-ADULT-

## 2018-11-08 DIAGNOSIS — Z85.3 HISTORY OF BREAST CANCER: Primary | ICD-10-CM

## 2018-12-07 ENCOUNTER — RADIANT APPOINTMENT (OUTPATIENT)
Dept: MAMMOGRAPHY | Facility: CLINIC | Age: 78
End: 2018-12-07
Attending: FAMILY MEDICINE
Payer: MEDICARE

## 2018-12-07 DIAGNOSIS — Z85.3 HISTORY OF BREAST CANCER: ICD-10-CM

## 2018-12-12 ENCOUNTER — OFFICE VISIT (OUTPATIENT)
Dept: OPHTHALMOLOGY | Facility: CLINIC | Age: 78
End: 2018-12-12
Attending: OPHTHALMOLOGY
Payer: MEDICARE

## 2018-12-12 DIAGNOSIS — H40.052 BORDERLINE GLAUCOMA WITH OCULAR HYPERTENSION, LEFT: ICD-10-CM

## 2018-12-12 DIAGNOSIS — H40.052 BORDERLINE GLAUCOMA WITH OCULAR HYPERTENSION, LEFT: Primary | ICD-10-CM

## 2018-12-12 DIAGNOSIS — H40.1412: ICD-10-CM

## 2018-12-12 PROCEDURE — G0463 HOSPITAL OUTPT CLINIC VISIT: HCPCS | Mod: ZF

## 2018-12-12 PROCEDURE — 92083 EXTENDED VISUAL FIELD XM: CPT | Mod: ZF | Performed by: OPHTHALMOLOGY

## 2018-12-12 ASSESSMENT — TONOMETRY
IOP_METHOD: APPLANATION
OS_IOP_MMHG: 21
OD_IOP_MMHG: 20

## 2018-12-12 ASSESSMENT — CONF VISUAL FIELD
METHOD: COUNTING FINGERS
OS_NORMAL: 1
OD_NORMAL: 1

## 2018-12-12 ASSESSMENT — VISUAL ACUITY
METHOD: SNELLEN - LINEAR
OS_CC+: -1
OD_CC: 20/20
OS_CC: 20/20

## 2018-12-12 ASSESSMENT — REFRACTION_WEARINGRX
OD_AXIS: 025
OS_CYLINDER: +1.00
OS_AXIS: 006
SPECS_TYPE: PAL
OD_SPHERE: -3.25
OD_ADD: +2.50
OS_SPHERE: -3.00
OS_ADD: +2.50
OD_CYLINDER: +1.00

## 2018-12-12 ASSESSMENT — SLIT LAMP EXAM - LIDS
COMMENTS: NORMAL
COMMENTS: NORMAL

## 2018-12-12 ASSESSMENT — CUP TO DISC RATIO
OD_RATIO: 0.6
OS_RATIO: 0.6

## 2018-12-12 NOTE — PROGRESS NOTES
CC: follow up for Pseudoexfoliation  Ocular hypertension-intraocular pressure fluctuates   Previously nl visual field and OCT  Cataracts-not symptomatic    HPI:  Selected laser trabeculoplasty (SLT) right eye (4/1/16)  No visual changes noted. Eyes comfortable on latanoprost.     Getting yearly visual field alternating with yearly OCT    Medications   Latanoprost both eyes at bedtime (started 6/17)    Testing   Octopus visual field 24-2 12/12/18   OD superior arcuate, corresponds to inferior thinning on OCT retinal nerve fiber layer, stable   OS non specific changes with decreased MD    OCT retinal nerve fiber layer 06/15/18   Right eye: inferotemporal thinning and general thinning.     There has been slow progression (thinning over 5 years) including worsening since selective laser trabeculoplasty    Left eye: Slow progression superiorly, nasally, and inferonasally    Previous testing  Disc photos stable compared to 2012 both eyes       Exam  Has kelechi eyes, was started on latanoprost - Prefers not to be on medications  Retired pharmacist      Impression  S/p Selected laser trabeculoplasty (SLT) right eye 4/1/16   Intraocular pressure upper teens/low 20s today (IOP was 25/30 prior to starting latanoprost) s/p starting latanoprost both eyes 6/28/17 and s/p Selected laser trabeculoplasty (SLT)    Moderate Pseudoexfoliation glaucoma right eye, 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 I personally obtained the chief  complaint(s) and history of present illness. I confirmed and edited asnecessary the review of systems, past medical/surgical history, family history, social history, and examination findings as documented by others; and I examined the patient myself. I personally reviewed the relevant tests, images, and reports as documented above. I formulated and edited as necessary the assessment and plan and discussed the findings and management plan with the patient and family.  - Rhiannon Christy MD 1:50 PM 12/12/2018

## 2019-02-04 ENCOUNTER — OFFICE VISIT (OUTPATIENT)
Dept: FAMILY MEDICINE | Facility: CLINIC | Age: 79
End: 2019-02-04
Payer: MEDICARE

## 2019-02-04 VITALS
OXYGEN SATURATION: 98 % | SYSTOLIC BLOOD PRESSURE: 142 MMHG | WEIGHT: 162 LBS | HEART RATE: 86 BPM | DIASTOLIC BLOOD PRESSURE: 88 MMHG | TEMPERATURE: 97.6 F | BODY MASS INDEX: 31.8 KG/M2 | HEIGHT: 60 IN

## 2019-02-04 DIAGNOSIS — Z13.1 SCREENING FOR DIABETES MELLITUS: ICD-10-CM

## 2019-02-04 DIAGNOSIS — Z13.220 SCREENING CHOLESTEROL LEVEL: ICD-10-CM

## 2019-02-04 DIAGNOSIS — Z00.00 MEDICARE ANNUAL WELLNESS VISIT, SUBSEQUENT: Primary | ICD-10-CM

## 2019-02-04 LAB
CHOLEST SERPL-MCNC: 194 MG/DL (ref 0–200)
CHOLEST/HDLC SERPL: 2.4 {RATIO} (ref 0–5)
FASTING SPECIMEN: YES
GLUCOSE P FAST SERPL-MCNC: 102 MG/DL (ref 51–109)
HDLC SERPL-MCNC: 82 MG/DL
LDLC SERPL CALC-MCNC: 96 MG/DL (ref 0–129)
TRIGL SERPL-MCNC: 84 MG/DL (ref 0–150)
VLDL-CHOLESTEROL: 17 (ref 7–32)

## 2019-02-04 ASSESSMENT — MIFFLIN-ST. JEOR: SCORE: 1126.33

## 2019-02-04 ASSESSMENT — PAIN SCALES - GENERAL: PAINLEVEL: NO PAIN (0)

## 2019-02-04 NOTE — NURSING NOTE
78 year old  Chief Complaint   Patient presents with     Physical       Blood pressure 142/88, pulse 86, temperature 97.6  F (36.4  C), temperature source Oral, height 1.524 m (5'), weight 73.5 kg (162 lb), SpO2 98 %. Body mass index is 31.64 kg/m .  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        Social History     Tobacco Use     Smoking status: Never Smoker     Smokeless tobacco: Never Used   Substance Use Topics     Alcohol use: Yes     Alcohol/week: 0.0 oz     Comment: a glass of wine 4 times a week     Drug use: No       Health Maintenance Due   Topic Date Due     ADVANCE DIRECTIVE PLANNING Q5 YRS  12/10/1993     DEXA Q3 YR  04/11/2010     PHQ-2 Q1 YR  10/27/2017       No results found for: CASSIDY Wilson CMA  February 4, 2019 8:07 AM

## 2019-02-04 NOTE — PROGRESS NOTES
CHIEF COMPLAINT: Medicare annual wellness visit, subsequent      HISTORY OF PRESENT ILLNESS: Johnna Hurtado is a 78 year old female who presents for an annual physical. Overall, she is doing very well.      FAMILY, SOCIAL AND PAST SURGICAL HISTORIES:  Unchanged.       MEDICATIONS:  Carefully reviewed.       HEALTHCARE MAINTENANCE:    Health Maintenance   Topic Date Due     ADVANCE DIRECTIVE PLANNING Q5 YRS  12/10/1993     DEXA Q3 YR  04/11/2010     PHQ-2 Q1 YR  10/27/2017     FALL RISK ASSESSMENT  06/15/2019     DTAP/TDAP/TD IMMUNIZATION (3 - Td) 06/22/2021     LIPID SCREEN Q5 YR FEMALE (SYSTEM ASSIGNED)  10/27/2021     COLONOSCOPY Q10 YR  10/20/2025     INFLUENZA VACCINE  Completed     ZOSTER IMMUNIZATION  Completed     IPV IMMUNIZATION  Aged Out     MENINGITIS IMMUNIZATION  Aged Out     Eye exam: Eye care up to date. Followed closely every 6 months by Dr. Rhiannon Christy in Ophthalmology for pre-glaucoma. On latanoprost (Xalatan) drops.  Hearing: No concerns.  BP: Elevated slightly, will recheck.  BMI: Stable  Dental: Dental care up to date.  Immunizations: Up to date. Has received both Shingrix vaccines.  Colonoscopy: Last done 2015, repeat in 2020.   Mammogram: She had a mammogram of the right breast on 12/7/18, normal. (s/p left mastectomy due to breast cancer)  DEXA: Declined.    Medicare Preventive Visit:  1. No problems with ADLs  2. No falls  3. No depression  4. Not concerned about memory loss      REVIEW OF SYSTEMS:  A 10-point review is negative.       OBJECTIVE:    GENERAL: Johnna Hurtado is in no distress.  Affect is upbeat.   Alert and oriented x3  VITAL SIGNS: /88 (BP Location: Right arm, Patient Position: Chair, Cuff Size: Adult Regular)   Pulse 86   Temp 97.6  F (36.4  C) (Oral)   Ht 1.524 m (5')   Wt 73.5 kg (162 lb)   SpO2 98%   BMI 31.64 kg/m    HEENT:  Head is normocephalic, atraumatic. Ears clear bilaterally. No pain with palpation of the frontal or maxillary sinuses.  Eyes are  grossly normal.  Nose is free of any congestion or discharge.  Teeth in good repair.  Mucous membranes are moist.  Throat looks benign.  Neck is supple without adenopathy or thyromegaly.  No carotid bruits are heard, no JVD.   BACK:  Smooth and straight.  No pain to percussion.  No CVA tenderness.   LUNGS:  Clear to auscultation bilaterally.   HEART:  Regular rate and rhythm without murmur.   ABDOMEN:  Benign.     EXTREMITIES:  Ankles free of any edema.   SKIN:  Warm and dry.       LABORATORY DATA: Labs pending.      ASSESSMENT AND PLAN:   1. Medicare annual wellness visit, subsequent, UTD with HCM.  2. Declines DEXA scan as she has no interest in taking one of the bisphosphonate medications  3. Up to date on colon cancer screening. Will discuss colon cancer screening next year, colonoscopy vs. Cologuard test.  4. Labs: Lipid panel and glucose, pending.    Call with any problems or questions.       I, Lexy Shine, am serving as a scribe to document services personally performed by Dr. Dominic Hopkins, based on data collection and the provider's statements to me. Dr. Hopkins has reviewed, edited, and approved the above note.     --Dominic Hopkins MD

## 2019-05-14 ENCOUNTER — APPOINTMENT (OUTPATIENT)
Dept: ULTRASOUND IMAGING | Facility: CLINIC | Age: 79
End: 2019-05-14
Attending: EMERGENCY MEDICINE
Payer: MEDICARE

## 2019-05-14 ENCOUNTER — HOSPITAL ENCOUNTER (EMERGENCY)
Facility: CLINIC | Age: 79
Discharge: HOME | End: 2019-05-14
Attending: EMERGENCY MEDICINE | Admitting: EMERGENCY MEDICINE
Payer: MEDICARE

## 2019-05-14 ENCOUNTER — TELEPHONE (OUTPATIENT)
Dept: FAMILY MEDICINE | Facility: CLINIC | Age: 79
End: 2019-05-14

## 2019-05-14 VITALS
BODY MASS INDEX: 31.64 KG/M2 | SYSTOLIC BLOOD PRESSURE: 116 MMHG | RESPIRATION RATE: 18 BRPM | HEART RATE: 105 BPM | WEIGHT: 162 LBS | DIASTOLIC BLOOD PRESSURE: 88 MMHG | OXYGEN SATURATION: 98 % | TEMPERATURE: 98.8 F

## 2019-05-14 DIAGNOSIS — I82.441 ACUTE DEEP VEIN THROMBOSIS (DVT) OF TIBIAL VEIN OF RIGHT LOWER EXTREMITY (H): ICD-10-CM

## 2019-05-14 PROCEDURE — 93971 EXTREMITY STUDY: CPT | Mod: RT

## 2019-05-14 PROCEDURE — 99283 EMERGENCY DEPT VISIT LOW MDM: CPT | Mod: Z6 | Performed by: EMERGENCY MEDICINE

## 2019-05-14 PROCEDURE — 99284 EMERGENCY DEPT VISIT MOD MDM: CPT | Mod: 25 | Performed by: EMERGENCY MEDICINE

## 2019-05-14 ASSESSMENT — ENCOUNTER SYMPTOMS
NUMBNESS: 0
COLOR CHANGE: 1
ABDOMINAL PAIN: 0
FEVER: 0
DIARRHEA: 0
DIAPHORESIS: 0
CHILLS: 0

## 2019-05-14 NOTE — ED PROVIDER NOTES
[facility] EMERGENCY DEPARTMENT ([facility]– [place])    5/14/19        History     Chief Complaint   Patient presents with     Leg Swelling     right leg swollen, Hx. varicose veins. Seen at urgent care, adviced to come to ED for US.     The history is provided by the patient and medical records.     Johnna Hurtado is a 78 year old female with a past medical history significant for varicose veins, pre glaucoma and prior hx of breast cancer who presents to the Emergency Department today due to right leg swelling.  Patient reports she has a history of varicose veins which she believes was caused by standing for most of her working life (patient is a retired pharmacist).  She normally wears compression hose.  For the past week she has noticed 2 areas on her medial right leg which seem to be more swollen and red.  There is slightly tender.  She denies any known injury.  No prior history of thromboembolic disease.  No recent travel.  She denies chest pain, shortness of breath, fevers, chills, tingling, numbness, abdominal pain, or any other complaints.      I have reviewed the Medications, Allergies, Past Medical and Surgical History, and Social History in the [employer] system.    Past Medical History:   Diagnosis Date     Breast neoplasm      Cataract      Glaucoma     pxf     Ocular hypertension      Osteopenia      PVD (posterior vitreous detachment), right eye      PXF (pseudoexfoliation of lens capsule)        Past Surgical History:   Procedure Laterality Date     HC TRABECULOPLASTY BY LASER SURGERY Right      HYSTERECTOMY TOTAL ABDOMINAL       MASTECTOMY MODIFIED RADICAL      Left Breast       Family History   Problem Relation Age of Onset     Cancer - colorectal Father      Liver Disease Son         hemochromatosis     Glaucoma No family hx of      Macular Degeneration No family hx of      Amblyopia No family hx of        Social History     Tobacco Use     Smoking status: Never Smoker     Smokeless tobacco: Never Used    Substance Use Topics     Alcohol use: Yes     Alcohol/week: 0.0 oz     Comment: a glass of wine 4 times a week       No current facility-administered medications for this encounter.      Current Outpatient Medications   Medication     latanoprost (XALATAN) 0.005 % ophthalmic solution     Rivaroxaban (XARELTO STARTER PACK) 15 & 20 MG TBPK     UNABLE TO FIND      No Known Allergies      Review of Systems   Constitutional: Negative for chills, diaphoresis and fever.   Cardiovascular: Positive for leg swelling (Right leg). Negative for chest pain.   Gastrointestinal: Negative for abdominal pain and diarrhea.   Skin: Positive for color change (Right leg).   Neurological: Negative for numbness.   All other systems reviewed and are negative.      Physical Exam   BP: 115/78  Pulse: 99  Temp: 98.8  F (37.1  C)  Resp: 16  Weight: 73.5 kg (162 lb)  SpO2: 96 %      Physical Exam   Constitutional: She is oriented to person, place, and time. She appears well-developed and well-nourished. No distress.   Alert, cooperative,NAD   HENT:   Head: Normocephalic and atraumatic.   Cardiovascular: Normal rate, regular rhythm, normal heart sounds and intact distal pulses.   Pulmonary/Chest: Effort normal and breath sounds normal. No respiratory distress. She has no wheezes. She has no rales.   Abdominal: Soft. Bowel sounds are normal. She exhibits no distension. There is no tenderness.   Obese, soft, nontender   Musculoskeletal: She exhibits edema and tenderness.   Bilateral lower extremity swelling noted, 2+.  Right leg demonstrates some significant varicosities.  There are 2 areas along the medial proximal calf and the medial distal thigh that demonstrate areas of erythema.  This is not significantly warm to touch but is quite tender.  Distal CMS is intact.   Neurological: She is alert and oriented to person, place, and time.   Skin: Skin is warm and dry. She is not diaphoretic.   Psychiatric: She has a normal mood and affect.   Nursing  note and vitals reviewed.      ED Course   4:39 PM  The patient was seen and examined by Fiorella Ayers MD in Room ED19.        Procedures             Critical Care time:  none             Labs Ordered and Resulted from Time of ED Arrival Up to the Time of Departure from the ED - No data to display    Results for orders placed or performed during the hospital encounter of 05/14/19 (from the past 24 hour(s))   US Lower Extremity Venous Duplex Right    Narrative    VENOUS ULTRASOUND RIGHT LEG  5/14/2019 5:30 PM     HISTORY: two areas near varicose veins of redness and induration,  please eval for DVT or superficial thrombus at sites of redness    COMPARISON: None.    FINDINGS:  Examination of the deep veins with graded compression and  color flow Doppler with spectral wave form analysis shows DVT in the  posterior tibial vein of the calf. There is also extensive thrombus in  the greater saphenous vein extending from the junction with the common  femoral vein to the mid calf.      Impression    IMPRESSION:   1. Study is positive for DVT in the posterior tibial veins of the  calf.    2. Study is positive for thrombus in the greater saphenous vein. This  is a superficial vein. This extends from the medial thigh to the mid  calf.    ANAMARIA ROBLEDO MD            Assessments & Plan (with Medical Decision Making)   This is a 78 year old female who presents to the emergency department today complaining of right leg swelling and areas of erythema.  She does have varicose veins on that leg does wear compression stockings.  However, she has been noticing two distinct seperate areas that are a bit tender and red.  On exam she does have some swelling of that leg with obvious prominence of varicose veins, there is an area in the medial proximal calf and the medial distal thigh that seems a bit tender to palpation and is erythematous.  The area is not warm however to touch.    Differential diagnosis could include DVT, also need to  consider the possibility of a superficial thrombophlebitis.  Additionally cellulitis needs to be considered.    We did do an ultrasound of the leg which demonstrates thrombus in the greater saphenous vein which is a superficial vein, the thrombus extends from the medial side to the mid calf and is likely responsible for the tender erythematous regions we are seeing on exam.  However, she also has DVT in the posterior tibial veins of calf.    Patient is a retired pharmacist, I did discuss with her pharmacologic options for anticoagulation.  Certainly she could do Lovenox bridging with Coumadin, or alternatively she could also use Xarelto.  Risks and benefits of each were discussed.  Patient has opted for Xarelto.  Will discharge her with a prescription for Xarelto, 15 mg p.o. twice daily x3 weeks, then 20 mg p.o. daily.  She will need to follow-up with her primary clinic for further monitoring and refills.  Additionally she had questions about how long anticoagulation would need to be continued.  I will defer that to her primary clinic, generally it would be for at least 6 months for the first time DVT, however this appears unprovoked.  Will allow primary and/or hematology to make a determination about duration of anticoagulation.  Patient was advised that if she develops chest pain, shortness of breath, or syncope or other symptoms concerning for a PE she should immediately re-present to the emergency department.  Patient verbalizes understanding    This part of the medical record was transcribed by Eleazar Wright [employer].     I have reviewed the nursing notes.    I have reviewed the findings, diagnosis, plan and need for follow up with the patient.       Medication List      Started    Rivaroxaban 15 & 20 MG Tbpk  Commonly known as:  XARELTO STARTER PACK  Take one 15 mg tablet PO twice daily x3 weeks.  Then take one 20 mg tablet PO daily.            Final diagnoses:   Acute deep vein thrombosis (DVT) of  tibial vein of right lower extremity (H)     I, Kirit Rothman, am serving as a trained medical scribe to document services personally performed by Fiorella Ayers MD, based on the provider's statements to me.   I,  Fiorella Ayers MD, was physically present and have reviewed and verified the accuracy of this note documented by Kirit Rothman.    5/14/2019   [facility], [place], EMERGENCY DEPARTMENT     Fiorella Ayers MD  05/15/19 0034

## 2019-05-14 NOTE — ED AVS SNAPSHOT
[facility], [place], Emergency Department  [address]  [facility] [address]  Phone:  [phone]  Fax:  [phone]                                    Johnna Hurtado   MRN: [id number]    Department:  [facility], Emergency Department   Date of Visit:  5/14/2019           After Visit Summary Signature Page    I have received my discharge instructions, and my questions have been answered. I have discussed any challenges I see with this plan with the nurse or doctor.    ..........................................................................................................................................  Patient/Patient Representative Signature      ..........................................................................................................................................  Patient Representative Print Name and Relationship to Patient    ..................................................               ................................................  Date                                   Time    ..........................................................................................................................................  Reviewed by Signature/Title    ...................................................              ..............................................  Date                                               Time          22EPIC Rev 08/18

## 2019-05-14 NOTE — TELEPHONE ENCOUNTER
[facility] Call Center    Phone Message    May a detailed message be left on voicemail: yes    Reason for Call: Other: The patient would like a call asap to speak with a nurse about Varicose veins issues she can't wait till 5/22/19 her conditions care changing per the patient please call patient to address concerns     Action Taken: Message routed to:  [facility] ([facility] pcc

## 2019-05-14 NOTE — TELEPHONE ENCOUNTER
Called and LVM - if patient would still like to speak to a nurse, please call back and let the call center know you missed a call from nurse. OK to transfer this call to clinic.     Shannon Garrison RN  05/14/19  3:29 PM

## 2019-05-14 NOTE — DISCHARGE INSTRUCTIONS
You have been seen in the emergency department today for a DVT, a blood clot in the deep veins of your right leg.  This blood clot also continues up your thigh in a superficial vein which is why you have the areas of redness.  You should take the blood thinning medications as directed.  We advise that you follow-up with your primary clinic within 1 to 2 weeks for recheck, all further refills will need to come through your primary clinic as well.  If you are noticing shortness of breath, chest pain, palpitations, or fainting episodes, you should come back to the emergency department immediately.

## 2019-05-22 ENCOUNTER — OFFICE VISIT (OUTPATIENT)
Dept: FAMILY MEDICINE | Facility: CLINIC | Age: 79
End: 2019-05-22
Payer: MEDICARE

## 2019-05-22 VITALS
OXYGEN SATURATION: 99 % | DIASTOLIC BLOOD PRESSURE: 78 MMHG | RESPIRATION RATE: 16 BRPM | BODY MASS INDEX: 31.74 KG/M2 | WEIGHT: 162.5 LBS | TEMPERATURE: 97.9 F | SYSTOLIC BLOOD PRESSURE: 111 MMHG | HEART RATE: 97 BPM

## 2019-05-22 DIAGNOSIS — I82.431 ACUTE DEEP VEIN THROMBOSIS (DVT) OF POPLITEAL VEIN OF RIGHT LOWER EXTREMITY (H): Primary | ICD-10-CM

## 2019-05-22 NOTE — PROGRESS NOTES
CHIEF COMPLAINT:  ED follow-up for DVT      HISTORY OF PRESENT ILLNESS: Johnna Hurtado is a 78 year old female who presents for above. At the end of April 2019, Johnna traveled to [place] and attended some classes. After she returned, she developed pain and swelling in her right calf. She has a history of varicose veins, and typically wears compression hose. She suspected the symptoms were related to this. However, the symptoms continued to worsen and she presented to [facility] on 5/14/19 for evaluation. She was then referred to the ED for an US. The venous US of the right leg had the following results:    1. Study is positive for DVT in the posterior tibial veins of the calf.    2. Study is positive for thrombus in the greater saphenous vein. This is a superficial vein. This extends from the medial thigh to the mid-calf.    She was discharge with a prescription for Xarelto, 15 mg p.o. twice daily x3 weeks, then 20 mg p.o. daily, and she filled the prescription the next day.    She reports today that her swelling and pain has improved since discharge, but is still present. She has been taking Xarelto 15mg BID, and will continue this for another 2 weeks. She would like me to take over prescribing this medication in the future.      FAMILY, SOCIAL AND PAST SURGICAL HISTORIES:  Unchanged.       MEDICATIONS:  Carefully reviewed.       REVIEW OF SYSTEMS:  A 10-point review is negative except as otherwise noted.      OBJECTIVE:    GENERAL: Johnna is in no distress.  Affect is upbeat.     VITAL SIGNS: /78 (BP Location: Right arm, Patient Position: Sitting, Cuff Size: Adult Large)   Pulse 97   Temp 97.9  F (36.6  C) (Oral)   Resp 16   Wt 73.7 kg (162 lb 8 oz)   SpO2 99%   BMI 31.74 kg/m    HEENT:  Head is normocephalic, atraumatic.  EXTREMITIES:  Ankles free of any edema. Minimal to no R calf pain.  No significant thrombophlebitis, right inner thigh.    SKIN:  Warm and dry.           ASSESSMENT  AND PLAN:   1. DVT, right calf: I will take over Xarelto prescription after she finishes the starter pack. (20 mg daily, #90, 1 RF)    Call with any problems or questions.     I, Lexy Shine, am serving as a scribe to document services personally performed by Dr. Dominic Hopkins, based on data collection and the provider's statements to me. Dr. Hopkins has reviewed, edited, and approved the above note.     --Dominic Hopkins MD

## 2019-05-22 NOTE — NURSING NOTE
78 year old  Chief Complaint   Patient presents with     Deep Vein Thrombosis     on day 8 of xalrelto       Blood pressure 111/78, pulse 97, temperature 97.9  F (36.6  C), temperature source Oral, resp. rate 16, weight 73.7 kg (162 lb 8 oz), SpO2 99 %. Body mass index is 31.74 kg/m .  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        Social History     Tobacco Use     Smoking status: Never Smoker     Smokeless tobacco: Never Used   Substance Use Topics     Alcohol use: Yes     Alcohol/week: 0.0 oz     Comment: a glass of wine 4 times a week     Drug use: No       Health Maintenance Due   Topic Date Due     ADVANCED DIRECTIVE PLANNING  1940     LIPID  12/10/1983     DEXA  04/11/2010     FALL RISK ASSESSMENT  06/15/2019       No results found for: PAP      May 22, 2019 3:31 PM

## 2019-05-23 PROBLEM — I82.431 ACUTE DEEP VEIN THROMBOSIS (DVT) OF POPLITEAL VEIN OF RIGHT LOWER EXTREMITY (H): Status: ACTIVE | Noted: 2019-05-23

## 2019-06-12 ENCOUNTER — OFFICE VISIT (OUTPATIENT)
Dept: OPHTHALMOLOGY | Facility: CLINIC | Age: 79
End: 2019-06-12
Attending: OPHTHALMOLOGY
Payer: MEDICARE

## 2019-06-12 DIAGNOSIS — H40.052 BORDERLINE GLAUCOMA WITH OCULAR HYPERTENSION, LEFT: Primary | ICD-10-CM

## 2019-06-12 DIAGNOSIS — H40.052 BORDERLINE GLAUCOMA WITH OCULAR HYPERTENSION, LEFT: ICD-10-CM

## 2019-06-12 DIAGNOSIS — H27.8: ICD-10-CM

## 2019-06-12 PROCEDURE — 92133 CPTRZD OPH DX IMG PST SGM ON: CPT | Mod: ZF | Performed by: OPHTHALMOLOGY

## 2019-06-12 PROCEDURE — G0463 HOSPITAL OUTPT CLINIC VISIT: HCPCS | Mod: ZF

## 2019-06-12 RX ORDER — LATANOPROST 50 UG/ML
1 SOLUTION/ DROPS OPHTHALMIC AT BEDTIME
Qty: 7.5 ML | Refills: 3 | Status: SHIPPED | OUTPATIENT
Start: 2019-06-12

## 2019-06-12 ASSESSMENT — REFRACTION_WEARINGRX
OS_SPHERE: -3.00
OS_AXIS: 006
OD_SPHERE: -3.25
OS_ADD: +2.50
OD_CYLINDER: +1.00
OD_AXIS: 025
OD_ADD: +2.50
SPECS_TYPE: PAL
OS_CYLINDER: +1.00

## 2019-06-12 ASSESSMENT — SLIT LAMP EXAM - LIDS
COMMENTS: NORMAL
COMMENTS: NORMAL

## 2019-06-12 ASSESSMENT — VISUAL ACUITY
CORRECTION_TYPE: GLASSES
OD_CC+: -1
OS_CC+: -1
OS_CC: 20/20
OD_CC: 20/20
METHOD: SNELLEN - LINEAR

## 2019-06-12 ASSESSMENT — TONOMETRY
OS_IOP_MMHG: 25
OD_IOP_MMHG: 22
IOP_METHOD: APPLANATION

## 2019-06-12 ASSESSMENT — CONF VISUAL FIELD
OS_NORMAL: 1
OD_NORMAL: 1

## 2019-06-12 ASSESSMENT — CUP TO DISC RATIO
OD_RATIO: 0.8
OS_RATIO: 0.6

## 2019-06-12 NOTE — PROGRESS NOTES
CC: follow up for Pseudoexfoliation    HPI:  Ocular hypertension-intraocular pressure fluctuates   Previously nl visual field and OCT  Cataracts-not symptomatic  Selected laser trabeculoplasty (SLT) right eye (4/1/16)  No visual changes noted. Eyes comfortable on latanoprost.     Getting yearly visual field alternating with yearly OCT    Medications   Latanoprost both eyes at bedtime (started 6/17)    Testing   Octopus visual field 24-2 12/12/18   OD superior arcuate, corresponds to inferior thinning on OCT retinal nerve fiber layer, stable   OS non specific changes with decreased MD    OCT retinal nerve fiber layer 06/15/18   Right eye: inferotemporal thinning and general thinning.     There has been slow progression (thinning over 5 years) including worsening since selective laser trabeculoplasty    Left eye: slight downward slope although still borderline/normal, less change since starting latanoprost     Previous testing  Disc photos stable compared to 2012 both eyes     Exam  Has kelechi eyes, was started on latanoprost - Prefers not to be on medications  Retired pharmacist      Impression  S/p Selected laser trabeculoplasty (SLT) right eye 4/1/16   Intraocular pressure upper teens/low 20s today (IOP was 25/30 prior to starting latanoprost) s/p starting latanoprost both eyes 6/28/17 and s/p Selected laser trabeculoplasty (SLT)    Moderate Pseudoexfoliation glaucoma right eye, 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 I personally obtained the chief complaint(s) and history of present  illness. I confirmed and edited asnecessary the review of systems, past medical/surgical history, family history, social history, and examination findings as documented by others; and I examined the patient myself. I personally reviewed the relevant tests, images, and reports as documented above. I formulated and edited as necessary the assessment and plan and discussed the findings and management plan with the patient and family.  - Rhiannon Christy MD 9:13 AM 6/12/2019

## 2019-06-12 NOTE — NURSING NOTE
Chief Complaints and History of Present Illnesses   Patient presents with     Glaucoma Follow Up     Chief Complaint(s) and History of Present Illness(es)     Glaucoma Follow Up     Laterality: both eyes    Associated symptoms: Negative for flashes, floaters and eye pain    Pain scale: 0/10              Comments     Pt here for 6 month f/u pseudoexfoliation, both eyes - no change in vision noted    Ocular meds:  Latanoprost both eyes at bedtime    Meaghan JALLOH 8:38 AM June 12, 2019

## 2019-09-28 ENCOUNTER — HEALTH MAINTENANCE LETTER (OUTPATIENT)
Age: 79
End: 2019-09-28

## 2019-11-12 ENCOUNTER — OFFICE VISIT (OUTPATIENT)
Dept: FAMILY MEDICINE | Facility: CLINIC | Age: 79
End: 2019-11-12
Payer: MEDICARE

## 2019-11-12 VITALS
OXYGEN SATURATION: 98 % | DIASTOLIC BLOOD PRESSURE: 84 MMHG | RESPIRATION RATE: 16 BRPM | SYSTOLIC BLOOD PRESSURE: 138 MMHG | BODY MASS INDEX: 31.9 KG/M2 | WEIGHT: 158.25 LBS | HEART RATE: 75 BPM | HEIGHT: 59 IN | TEMPERATURE: 98.2 F

## 2019-11-12 DIAGNOSIS — Z86.718 HISTORY OF DEEP VENOUS THROMBOSIS: Primary | ICD-10-CM

## 2019-11-12 ASSESSMENT — MIFFLIN-ST. JEOR: SCORE: 1098.06

## 2019-11-12 NOTE — NURSING NOTE
78 year old  Chief Complaint   Patient presents with     Recheck Medication     blood thinner follow up        There were no vitals taken for this visit. There is no height or weight on file to calculate BMI.  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        Social History     Tobacco Use     Smoking status: Never Smoker     Smokeless tobacco: Never Used   Substance Use Topics     Alcohol use: Yes     Alcohol/week: 0.0 standard drinks     Comment: a glass of wine 4 times a week     Drug use: No       Health Maintenance Due   Topic Date Due     ADVANCE CARE PLANNING  1940     DEXA  04/11/2010     FALL RISK ASSESSMENT  06/15/2019     INFLUENZA VACCINE (1) 09/01/2019       No results found for: PAP      November 12, 2019 4:37 PM

## 2019-11-12 NOTE — PROGRESS NOTES
CHIEF COMPLAINT: Medication check      HISTORY OF PRESENT ILLNESS: Johnna Hurtado is a 78 year old female who presents for follow-up of her previous RLE DVT and subsequent anti-coagulation therapy. She took Xarelto 20 mg daily for 6 months.  She's had no pain in her right calf/leg, nor has she had any shortness of breath.  She feels great.       FAMILY, SOCIAL AND PAST SURGICAL HISTORIES:  Unchanged.       MEDICATIONS:  Carefully reviewed.       REVIEW OF SYSTEMS:  A 10-point review is negative except as otherwise noted.      OBJECTIVE:    GENERAL: She is in no distress.  Affect is upbeat.  Breathing comfortably.   VITAL SIGNS: There were no vitals taken for this visit.  LUNGS:  Clear to auscultation bilaterally.      EXTREMITIES:  Ankles free of any edema. No calf pain or swelling or erythema in RLE.  SKIN:  Warm and dry.       LABORATORY DATA: None      ASSESSMENT AND PLAN:   1. Johnna is 6+ months post DVT of RLE.  She can safely stop her anti-coagulation therapy at this time. No US needed at this time.  2. F/U prn    Call with any problems or questions.     --Dominic Hopkins MD

## 2019-11-22 ENCOUNTER — AMBULATORY - HEALTHEAST (OUTPATIENT)
Dept: OTHER | Facility: CLINIC | Age: 79
End: 2019-11-22

## 2019-11-22 ENCOUNTER — DOCUMENTATION ONLY (OUTPATIENT)
Dept: OTHER | Facility: CLINIC | Age: 79
End: 2019-11-22

## 2019-12-09 ENCOUNTER — TELEPHONE (OUTPATIENT)
Dept: FAMILY MEDICINE | Facility: CLINIC | Age: 79
End: 2019-12-09

## 2019-12-09 DIAGNOSIS — Z85.3 HISTORY OF BREAST CANCER: Primary | ICD-10-CM

## 2019-12-09 NOTE — TELEPHONE ENCOUNTER
[facility] Call Center    Phone Message    May a detailed message be left on voicemail: yes    Reason for Call: Order(s): Other:   Reason for requested: Patient request for a Diagnostic Mammogram to be placed in her chart  Date needed: 12/10/2019, please call Johnna once the order has been placed so she can schedule  Provider name: Dr. Hopkins      Action Taken: Message routed to:  [facility]: [facility]

## 2019-12-16 ENCOUNTER — OFFICE VISIT (OUTPATIENT)
Dept: OPHTHALMOLOGY | Facility: CLINIC | Age: 79
End: 2019-12-16
Attending: OPHTHALMOLOGY
Payer: MEDICARE

## 2019-12-16 DIAGNOSIS — H40.052 BORDERLINE GLAUCOMA WITH OCULAR HYPERTENSION, LEFT: Primary | ICD-10-CM

## 2019-12-16 DIAGNOSIS — H40.052 BORDERLINE GLAUCOMA WITH OCULAR HYPERTENSION, LEFT: ICD-10-CM

## 2019-12-16 PROCEDURE — G0463 HOSPITAL OUTPT CLINIC VISIT: HCPCS | Mod: ZF

## 2019-12-16 PROCEDURE — 92083 EXTENDED VISUAL FIELD XM: CPT | Mod: ZF | Performed by: OPHTHALMOLOGY

## 2019-12-16 ASSESSMENT — CONF VISUAL FIELD
OD_NORMAL: 1
METHOD: COUNTING FINGERS
OS_NORMAL: 1

## 2019-12-16 ASSESSMENT — TONOMETRY
OS_IOP_MMHG: 25
OS_IOP_MMHG: 25
IOP_METHOD: TONOPEN
OD_IOP_MMHG: 21
OD_IOP_MMHG: 21
IOP_METHOD: APPLANATION

## 2019-12-16 ASSESSMENT — VISUAL ACUITY
OS_CC+: -2
OD_CC: 20/20
METHOD: SNELLEN - LINEAR
OS_CC: 20/20

## 2019-12-16 ASSESSMENT — REFRACTION_WEARINGRX
OD_SPHERE: -3.25
OD_AXIS: 025
OD_CYLINDER: +1.00
OS_CYLINDER: +1.00
OS_ADD: +2.50
SPECS_TYPE: PAL
OS_SPHERE: -3.00
OS_AXIS: 006
OD_ADD: +2.50

## 2019-12-16 ASSESSMENT — SLIT LAMP EXAM - LIDS
COMMENTS: NORMAL
COMMENTS: NORMAL

## 2019-12-16 ASSESSMENT — CUP TO DISC RATIO
OS_RATIO: 0.6
OD_RATIO: 0.8

## 2019-12-16 NOTE — PROGRESS NOTES
CC: follow up for Pseudoexfoliation    HPI:  Ocular hypertension-intraocular pressure fluctuates   Previously nl visual field and OCT  Cataracts-not symptomatic  Selected laser trabeculoplasty (SLT) right eye (4/1/16)    Interval history: No visual changes noted. Eyes comfortable on latanoprost.     Getting yearly visual field alternating with yearly OCT    Medications   Latanoprost both eyes at bedtime (started 6/17)    Testing   Octopus visual field 24-2 12/16/19   OD superior arcuate, corresponds to inferior thinning on OCT retinal nerve fiber layer, stable, ? Early inf arc   OS non specific changes with decreased MD   Both eyes less reliable today, FN 30% left eye, FN 22% right eye     OCT retinal nerve fiber layer 06/15/18   Right eye: inferotemporal thinning and general thinning.     There has been slow progression (thinning over 5 years) including worsening since selective laser trabeculoplasty    Left eye: slight downward slope although still borderline/normal, less change since starting latanoprost     Previous testing  Disc photos stable compared to 2012 both eyes     Exam  Has kelechi eyes, was started on latanoprost - Prefers not to be on medications  Retired pharmacist      Impression  S/p Selected laser trabeculoplasty (SLT) right eye 4/1/16   Intraocular pressure upper teens/low 20s today (IOP was 25/30 prior to starting latanoprost) s/p starting latanoprost both eyes 6/28/17 and s/p Selected laser trabeculoplasty (SLT)    Moderate Pseudoexfoliation glaucoma right eye, 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 I personally obtained the chief complaint(s) and history of present illness. I confirmed and edited asnecessary the review of systems, past medical/surgical history, family history, social history, and examination findings as documented by others; and I examined the patient myself. I personally reviewed the relevant tests, images, and reports as documented above. I formulated and edited as necessary the assessment and plan and discussed the findings and management plan with the patient and family.  - Rhiannon Christy MD 10:01 AM 12/16/2019

## 2019-12-16 NOTE — NURSING NOTE
Chief Complaints and History of Present Illnesses   Patient presents with     Glaucoma Follow-Up     Chief Complaint(s) and History of Present Illness(es)     Glaucoma Follow-Up     Associated symptoms: Negative for floaters, flashes, dryness and eye pain    Compliance with Treatment: always    Pain scale: 0/10              Comments     Borderline glaucoma with ocular hypertension, left - Left Eye. She feels there is no vision changes since last visit.     Tanner Malcolm COT 8:28 AM December 16, 2019

## 2019-12-17 ENCOUNTER — AMBULATORY - HEALTHEAST (OUTPATIENT)
Dept: OTHER | Facility: CLINIC | Age: 79
End: 2019-12-17

## 2019-12-17 ENCOUNTER — DOCUMENTATION ONLY (OUTPATIENT)
Dept: OTHER | Facility: CLINIC | Age: 79
End: 2019-12-17

## 2019-12-23 ENCOUNTER — ANCILLARY PROCEDURE (OUTPATIENT)
Dept: MAMMOGRAPHY | Facility: CLINIC | Age: 79
End: 2019-12-23
Payer: MEDICARE

## 2019-12-23 DIAGNOSIS — Z85.3 HISTORY OF BREAST CANCER: ICD-10-CM

## 2020-01-20 ENCOUNTER — OFFICE VISIT (OUTPATIENT)
Dept: FAMILY MEDICINE | Facility: CLINIC | Age: 80
End: 2020-01-20
Payer: COMMERCIAL

## 2020-01-20 VITALS
TEMPERATURE: 98.2 F | HEART RATE: 100 BPM | RESPIRATION RATE: 16 BRPM | DIASTOLIC BLOOD PRESSURE: 74 MMHG | OXYGEN SATURATION: 98 % | SYSTOLIC BLOOD PRESSURE: 110 MMHG

## 2020-01-20 DIAGNOSIS — E04.1 THYROID NODULE: ICD-10-CM

## 2020-01-20 DIAGNOSIS — S82.831D CLOSED FRACTURE OF DISTAL END OF RIGHT FIBULA WITH ROUTINE HEALING, UNSPECIFIED FRACTURE MORPHOLOGY, SUBSEQUENT ENCOUNTER: ICD-10-CM

## 2020-01-20 DIAGNOSIS — S01.81XD FACIAL LACERATION, SUBSEQUENT ENCOUNTER: Primary | ICD-10-CM
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
## 2020-01-20 NOTE — NURSING NOTE
79 year old  Chief Complaint   Patient presents with     Suture Removal     face     Wound Check     right ankle       Blood pressure 110/74, pulse 100, temperature 98.2  F (36.8  C), temperature source Oral, resp. rate 16, SpO2 98 %. There is no height or weight on file to calculate BMI.  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        Social History     Tobacco Use     Smoking status: Never Smoker     Smokeless tobacco: Never Used   Substance Use Topics     Alcohol use: Yes     Alcohol/week: 0.0 standard drinks     Comment: a glass of wine 4 times a week     Drug use: No       Health Maintenance Due   Topic Date Due     DEXA  04/11/2010     PHQ-2  01/01/2020     MEDICARE ANNUAL WELLNESS VISIT  02/04/2020       No results found for: PAP      January 20, 2020 10:19 AM

## 2020-01-20 NOTE — NURSING NOTE
Patients ankle bandaged with non-adherent pads, coban and ace wrap. Patient was placed back in cam boot liner. Lucia Johnson CMA

## 2020-01-23 ENCOUNTER — TELEPHONE (OUTPATIENT)
Dept: FAMILY MEDICINE | Facility: CLINIC | Age: 80
End: 2020-01-23

## 2020-01-23 NOTE — TELEPHONE ENCOUNTER
Spoke to pt - noted she did see ortho provider at [facility] this week - chart note visible in our record, and can see that ortho ordered PT.  Pt unsure where she is to go for PT - she prefers [facility]. She will contact ortho for PT orders.  She will call back here prn questions.  Carmen Swain RN  [facility]
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
## 2020-02-04 ENCOUNTER — OFFICE VISIT (OUTPATIENT)
Dept: FAMILY MEDICINE | Facility: CLINIC | Age: 80
End: 2020-02-04
Payer: COMMERCIAL

## 2020-02-04 VITALS
SYSTOLIC BLOOD PRESSURE: 113 MMHG | TEMPERATURE: 98.4 F | OXYGEN SATURATION: 97 % | BODY MASS INDEX: 31.4 KG/M2 | DIASTOLIC BLOOD PRESSURE: 79 MMHG | RESPIRATION RATE: 16 BRPM | WEIGHT: 155.75 LBS | HEART RATE: 92 BPM | HEIGHT: 59 IN

## 2020-02-04 DIAGNOSIS — Z71.89 COUNSELING REGARDING END OF LIFE DECISION MAKING: ICD-10-CM

## 2020-02-04 DIAGNOSIS — S81.801D LEG WOUND, RIGHT, SUBSEQUENT ENCOUNTER: Primary | ICD-10-CM

## 2020-02-04 ASSESSMENT — MIFFLIN-ST. JEOR: SCORE: 1081.72
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
## 2020-02-05 ENCOUNTER — TELEPHONE (OUTPATIENT)
Dept: WOUND CARE | Facility: CLINIC | Age: 80
End: 2020-02-05

## 2020-02-05 NOTE — TELEPHONE ENCOUNTER
Lvm x1 advising pt that appt today 02/05 was cancelled due to provider being ill.  Per sujatha pt can be seen Friday 02/07 at 8 am or Monday 02/09 at 12 or 1 if they need to be seen urgent other wise pt can just have next available appt

## 2020-02-05 NOTE — TELEPHONE ENCOUNTER
[facility] Call Center    Phone Message    May a detailed message be left on voicemail: yes     Reason for Call: Other: Per Pt is wanting to get a call back in regards to getting a sooner apt. Pt states needing to be seen asap. I was not able to find the apts listed satish. Please Advise.      Action Taken: Message routed to:  [facility] Wound    Travel Screening: Not Applicable

## 2020-02-07 ENCOUNTER — OFFICE VISIT (OUTPATIENT)
Dept: WOUND CARE | Facility: CLINIC | Age: 80
End: 2020-02-07
Payer: COMMERCIAL

## 2020-02-07 DIAGNOSIS — S81.809A: Primary | ICD-10-CM

## 2020-02-07 NOTE — PATIENT INSTRUCTIONS
Ok to shower, wash wounds with Hibaclens  Apply Betadine swabs to upper wound with scab  Apply Bacitracin or Vaseline to lower heel wound  Cover with Telfa  Wrap with Ace wrap pretty tight
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
## 2020-02-18 ENCOUNTER — DOCUMENTATION ONLY (OUTPATIENT)
Dept: OTHER | Facility: CLINIC | Age: 80
End: 2020-02-18

## 2020-02-18 ENCOUNTER — AMBULATORY - HEALTHEAST (OUTPATIENT)
Dept: OTHER | Facility: CLINIC | Age: 80
End: 2020-02-18

## 2020-02-19 ENCOUNTER — OFFICE VISIT (OUTPATIENT)
Dept: OPHTHALMOLOGY | Facility: CLINIC | Age: 80
End: 2020-02-19
Attending: OPHTHALMOLOGY
Payer: MEDICARE

## 2020-02-19 ENCOUNTER — OFFICE VISIT (OUTPATIENT)
Dept: WOUND CARE | Facility: CLINIC | Age: 80
End: 2020-02-19
Payer: MEDICARE

## 2020-02-19 DIAGNOSIS — S81.801D TRAUMATIC OPEN WOUND OF RIGHT LOWER LEG, SUBSEQUENT ENCOUNTER: Primary | ICD-10-CM

## 2020-02-19 DIAGNOSIS — H40.052 BORDERLINE GLAUCOMA WITH OCULAR HYPERTENSION, LEFT: Primary | ICD-10-CM

## 2020-02-19 DIAGNOSIS — H40.052 BORDERLINE GLAUCOMA WITH OCULAR HYPERTENSION, LEFT: ICD-10-CM

## 2020-02-19 PROCEDURE — G0463 HOSPITAL OUTPT CLINIC VISIT: HCPCS | Mod: ZF

## 2020-02-19 PROCEDURE — 92083 EXTENDED VISUAL FIELD XM: CPT | Mod: ZF | Performed by: OPHTHALMOLOGY

## 2020-02-19 ASSESSMENT — VISUAL ACUITY
METHOD: SNELLEN - LINEAR
OD_CC+: -2
OS_CC+: -3
OS_CC: 20/20
OD_CC: 20/20
CORRECTION_TYPE: GLASSES

## 2020-02-19 ASSESSMENT — SLIT LAMP EXAM - LIDS
COMMENTS: NORMAL
COMMENTS: NORMAL

## 2020-02-19 ASSESSMENT — REFRACTION_WEARINGRX
OD_AXIS: 025
OS_SPHERE: -3.00
OD_ADD: +2.50
OD_SPHERE: -3.25
OS_AXIS: 006
OS_CYLINDER: +1.00
OD_CYLINDER: +1.00
SPECS_TYPE: PAL
OS_ADD: +2.50

## 2020-02-19 ASSESSMENT — PACHYMETRY
OS_CT(UM): 569
OD_CT(UM): 543

## 2020-02-19 ASSESSMENT — TONOMETRY
IOP_METHOD: APPLANATION
OS_IOP_MMHG: 25
OS_IOP_MMHG: 25
IOP_METHOD: APPLANATION
OD_IOP_MMHG: 22
OD_IOP_MMHG: 22

## 2020-02-19 ASSESSMENT — CONF VISUAL FIELD
OS_NORMAL: 1
OD_NORMAL: 1
METHOD: COUNTING FINGERS

## 2020-02-19 ASSESSMENT — CUP TO DISC RATIO
OS_RATIO: 0.6
OD_RATIO: 0.8
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
## 2020-02-19 NOTE — NURSING NOTE
Chief Complaints and History of Present Illnesses   Patient presents with     Glaucoma Follow-Up     Chief Complaint(s) and History of Present Illness(es)     Glaucoma Follow-Up     Compliance with Treatment: always    Pain scale: 0/10              Comments     Glaucoma follow up for Borderline glaucoma with ocular hypertension, left.  The patient notes she has stable vision.   She uses Latanoprost at bedtime in both eyes.  Angela Miner, COA, COA 10:21 AM 02/19/2020
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
## 2020-02-28 ENCOUNTER — OFFICE VISIT (OUTPATIENT)
Dept: FAMILY MEDICINE | Facility: CLINIC | Age: 80
End: 2020-02-28
Payer: MEDICARE

## 2020-02-28 VITALS
RESPIRATION RATE: 15 BRPM | WEIGHT: 155.5 LBS | HEIGHT: 59 IN | BODY MASS INDEX: 31.35 KG/M2 | TEMPERATURE: 97.6 F | SYSTOLIC BLOOD PRESSURE: 121 MMHG | HEART RATE: 80 BPM | OXYGEN SATURATION: 98 % | DIASTOLIC BLOOD PRESSURE: 78 MMHG

## 2020-02-28 DIAGNOSIS — Z86.0101 HISTORY OF ADENOMATOUS POLYP OF COLON: ICD-10-CM

## 2020-02-28 DIAGNOSIS — D22.9 CHANGE IN MOLE: ICD-10-CM

## 2020-02-28 DIAGNOSIS — Z00.00 MEDICARE ANNUAL WELLNESS VISIT, SUBSEQUENT: Primary | ICD-10-CM

## 2020-02-28 DIAGNOSIS — Z13.820 SCREENING FOR OSTEOPOROSIS: ICD-10-CM

## 2020-02-28 DIAGNOSIS — Z13.220 SCREENING CHOLESTEROL LEVEL: ICD-10-CM

## 2020-02-28 ASSESSMENT — MIFFLIN-ST. JEOR: SCORE: 1083.09

## 2020-02-28 NOTE — NURSING NOTE
"79 year old  Chief Complaint   Patient presents with     Physical       Blood pressure 121/78, pulse 80, temperature 97.6  F (36.4  C), temperature source Oral, resp. rate 15, height 1.51 m (4' 11.45\"), weight 70.5 kg (155 lb 8 oz), SpO2 98 %. Body mass index is 30.93 kg/m .  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        Social History     Tobacco Use     Smoking status: Never Smoker     Smokeless tobacco: Never Used   Substance Use Topics     Alcohol use: Yes     Alcohol/week: 0.0 standard drinks     Comment: a glass of wine 4 times a week     Drug use: No       Health Maintenance Due   Topic Date Due     MEDICARE ANNUAL WELLNESS VISIT  02/04/2020       No results found for: PAP      February 28, 2020 8:43 AM    "

## 2020-03-01 PROBLEM — Z86.0101 HISTORY OF ADENOMATOUS POLYP OF COLON: Status: ACTIVE | Noted: 2020-03-01

## 2020-03-03 DIAGNOSIS — Z13.220 SCREENING CHOLESTEROL LEVEL: ICD-10-CM

## 2020-03-03 DIAGNOSIS — Z00.00 MEDICARE ANNUAL WELLNESS VISIT, SUBSEQUENT: ICD-10-CM

## 2020-03-03 LAB
BUN SERPL-MCNC: 18 MG/DL (ref 7–30)
CALCIUM SERPL-MCNC: 9.7 MG/DL (ref 8.5–10.4)
CHLORIDE SERPLBLD-SCNC: 109 MMOL/L (ref 94–109)
CHOLEST SERPL-MCNC: 194 MG/DL (ref 0–200)
CHOLEST/HDLC SERPL: 2.2 {RATIO} (ref 0–5)
CO2 SERPL-SCNC: 29 MMOL/L (ref 20–32)
CREAT SERPL-MCNC: 0.8 MG/DL (ref 0.6–1.3)
EGFR CALCULATED (BLACK REFERENCE): 88.5
EGFR CALCULATED (NON BLACK REFERENCE): 73.2
FASTING SPECIMEN: YES
GLUCOSE SERPL-MCNC: 98 MG/DL (ref 60–99)
HDLC SERPL-MCNC: 88 MG/DL
LDLC SERPL CALC-MCNC: 88 MG/DL (ref 0–129)
POTASSIUM SERPL-SCNC: 5.1 MMOL/L (ref 3.4–5.3)
SODIUM SERPL-SCNC: 144 MMOL/L (ref 137.3–146.3)
TRIGL SERPL-MCNC: 89 MG/DL (ref 0–150)
VLDL-CHOLESTEROL: 18 (ref 7–32)

## 2020-03-04 ENCOUNTER — OFFICE VISIT (OUTPATIENT)
Dept: WOUND CARE | Facility: CLINIC | Age: 80
End: 2020-03-04
Payer: MEDICARE

## 2020-03-04 DIAGNOSIS — R60.0 BILATERAL LEG EDEMA: ICD-10-CM

## 2020-03-04 DIAGNOSIS — R23.4 ESCHAR: ICD-10-CM

## 2020-03-04 DIAGNOSIS — S81.801D TRAUMATIC OPEN WOUND OF RIGHT LOWER LEG, SUBSEQUENT ENCOUNTER: Primary | ICD-10-CM
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
## 2020-03-12 ENCOUNTER — OFFICE VISIT (OUTPATIENT)
Dept: WOUND CARE | Facility: CLINIC | Age: 80
End: 2020-03-12
Payer: MEDICARE

## 2020-03-12 DIAGNOSIS — R23.4 ESCHAR: ICD-10-CM

## 2020-03-12 DIAGNOSIS — S81.801D TRAUMATIC OPEN WOUND OF RIGHT LOWER LEG, SUBSEQUENT ENCOUNTER: Primary | ICD-10-CM

## 2020-03-12 DIAGNOSIS — R60.0 BILATERAL LEG EDEMA: ICD-10-CM
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
## 2020-03-19 ENCOUNTER — OFFICE VISIT (OUTPATIENT)
Dept: WOUND CARE | Facility: CLINIC | Age: 80
End: 2020-03-19
Payer: MEDICARE

## 2020-03-19 DIAGNOSIS — S81.801D TRAUMATIC OPEN WOUND OF RIGHT LOWER LEG, SUBSEQUENT ENCOUNTER: Primary | ICD-10-CM
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
## 2020-03-26 ENCOUNTER — OFFICE VISIT (OUTPATIENT)
Dept: WOUND CARE | Facility: CLINIC | Age: 80
End: 2020-03-26
Payer: MEDICARE

## 2020-03-26 DIAGNOSIS — S81.801D TRAUMATIC OPEN WOUND OF RIGHT LOWER LEG, SUBSEQUENT ENCOUNTER: Primary | ICD-10-CM

## 2020-03-26 NOTE — PATIENT INSTRUCTIONS
Remove Bandaid and ok to shower, clean with soap and water  Place Polymem inside the wound with a little Vaseline at edges of the wound  Wear compression hose, and elevate at night
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
## 2020-04-08 ENCOUNTER — OFFICE VISIT (OUTPATIENT)
Dept: WOUND CARE | Facility: CLINIC | Age: 80
End: 2020-04-08
Payer: MEDICARE

## 2020-04-08 DIAGNOSIS — S81.801D TRAUMATIC OPEN WOUND OF RIGHT LOWER LEG, SUBSEQUENT ENCOUNTER: Primary | ICD-10-CM
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
## 2020-04-23 ENCOUNTER — OFFICE VISIT (OUTPATIENT)
Dept: WOUND CARE | Facility: CLINIC | Age: 80
End: 2020-04-23
Payer: MEDICARE

## 2020-04-23 DIAGNOSIS — S81.801D TRAUMATIC OPEN WOUND OF RIGHT LOWER LEG, SUBSEQUENT ENCOUNTER: Primary | ICD-10-CM
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
## 2020-07-20 ENCOUNTER — OFFICE VISIT (OUTPATIENT)
Dept: OPHTHALMOLOGY | Facility: CLINIC | Age: 80
End: 2020-07-20
Attending: OPHTHALMOLOGY
Payer: MEDICARE

## 2020-07-20 DIAGNOSIS — H40.052 BORDERLINE GLAUCOMA WITH OCULAR HYPERTENSION, LEFT: ICD-10-CM

## 2020-07-20 DIAGNOSIS — H25.13 NUCLEAR SENILE CATARACT OF BOTH EYES: ICD-10-CM

## 2020-07-20 DIAGNOSIS — H40.1492 PSEUDOEXFOLIATION (PXF) GLAUCOMA, MODERATE STAGE: Primary | ICD-10-CM

## 2020-07-20 PROCEDURE — G0463 HOSPITAL OUTPT CLINIC VISIT: HCPCS | Mod: ZF

## 2020-07-20 PROCEDURE — 92133 CPTRZD OPH DX IMG PST SGM ON: CPT | Mod: ZF | Performed by: OPHTHALMOLOGY

## 2020-07-20 RX ORDER — LATANOPROST 50 UG/ML
1 SOLUTION/ DROPS OPHTHALMIC AT BEDTIME
Qty: 1 BOTTLE | Refills: 11 | Status: SHIPPED | OUTPATIENT
Start: 2020-07-20

## 2020-07-20 ASSESSMENT — SLIT LAMP EXAM - LIDS
COMMENTS: NORMAL
COMMENTS: NORMAL

## 2020-07-20 ASSESSMENT — VISUAL ACUITY
OS_CC+: -1
METHOD: SNELLEN - LINEAR
OD_CC: 20/20
OD_CC+: -3
OS_CC: 20/25

## 2020-07-20 ASSESSMENT — TONOMETRY
OS_IOP_MMHG: 24
IOP_METHOD: APPLANATION
IOP_METHOD: APPLANATION
OD_IOP_MMHG: 18
OS_IOP_MMHG: 21
OD_IOP_MMHG: 25

## 2020-07-20 ASSESSMENT — REFRACTION_WEARINGRX
OS_SPHERE: -3.00
OS_CYLINDER: +1.00
OS_AXIS: 006
OD_ADD: +2.50
OD_CYLINDER: +1.00
OD_SPHERE: -3.25
OD_AXIS: 025
OS_ADD: +2.50
SPECS_TYPE: PAL

## 2020-07-20 ASSESSMENT — CONF VISUAL FIELD
OS_NORMAL: 1
METHOD: COUNTING FINGERS
OD_NORMAL: 1

## 2020-07-20 NOTE — NURSING NOTE
Chief Complaints and History of Present Illnesses   Patient presents with     Glaucoma Follow-Up     Chief Complaint(s) and History of Present Illness(es)     Glaucoma Follow-Up     Laterality: left eye    Associated symptoms: Negative for floaters, flashes, dryness and eye pain    Compliance with Treatment: always    Pain scale: 0/10              Comments     Johnna is here to continue care for Borderline glaucoma with ocular hypertension, left - Left Eye.She feels vision is about he same as last visit.     Tanner Malcolm COT 9:37 AM July 20, 2020

## 2020-07-20 NOTE — PATIENT INSTRUCTIONS
Patient will continue on Latanoprost which is a teal top drop at bedtime in BOTH EYES.  A long discussion of the risks, benefits, and alternatives including potential treatment and management options were had with patient and a decision was made to retry laser in the left eye followed by the right eye prior to considering additional drops.  Patient will return to clinic with gonioscopy, undilated eye exam and IOP check.       This drop may cause lash growth and some darkening of the skin around the eye.  It is also possible in a patient with a freckled iris or kelechi eyes, that the iris could darken to brown with time, something that is not common but not reversible.

## 2020-07-20 NOTE — PROGRESS NOTES
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 Christy, 43 shots) --  K pachy: 543/569   Tmax:31/30     HVF: OD:early sup arcuate with IN dec sens and OS:Fluct     CDR:     HRT/OCT:  OD:Mod RNFL thinning (prog over several years, RNFL WNL in 2010 and Mod in 2018) and OS:tr RNFL thinning with ?slow prog vs fluct over many year)    FHX of Glc: No     Gonio:       Intolerant to:      Asthma/COPD: No  Steroid Use: No    Kidney Stones:No     Sulfa Allergy: no     IOP targets:M-Hteens   2)NS OU -- retired pharmacist   3)H/O DVT -- not on medication at this time   4)NS OU     HVF OD:Full in 2013, Nasal dec sens in 2015, prog in 2018 with sup arcuate -- prog over several years with IOPs in L-M20s and prog of ONH photos --     Patient will continue on Latanoprost which is a teal top drop at bedtime in BOTH EYES.  A long discussion of the risks, benefits, and alternatives including potential treatment and management options were had with patient and a decision was made to retry laser in the left eye followed by the right eye prior to considering additional drops.  Patient will return to clinic with gonioscopy, undilated eye exam and IOP check.       This drop may cause lash growth and some darkening of the skin around the eye.  It is also possible in a patient with a freckled iris or kelechi eyes, that the iris could darken to brown with time, something that is not common but not reversible.        Resident Note (for educational purposes, see above for Assessment and Plan):  1) OHTN ou s/p SLT right eye 4/2016  K pachy: 543/569 Tmax: 31/31 HVF: right eye sup arcuate; left eye nonspecific deficits decreased MD CDR:  HRT/OCT: Mod thinning worse since 2015 FHX of Glc:  No  Gonio: open  Intolerant to: no Asthma/COPD: no Steroid Use: No  Kidney Stones: No Sulfa Allergy: No  IOP targets: High teens  2) NS - minimal visual significance, observe      Retired pharmacist    Plan:  No problems affording/remembering latanoprost at bedtime  ou  HVF stable sup arc and inf ns right eye since SLT 2016; left eye worsening MD unclear etiology although IOP persistently mid-20's  Progression of inferior thinning OCT rNFL right eye; stable slowing left eye  Given worsening on OCT rNFL right eye and HVF left eye depressed MD, reasonable to consider adding cosopt BID ou v. Repeating SLT ou (patient prefers SLT)  RTC 1-2 months IOP check, then 4-6 months HVF 24-2 ou with DFE      Reggie Zimmerman, PGY3  Ophthalmology Resident      Attending Physician Attestation:  Complete documentation of historical and exam elements from today's encounter can be found in the full encounter summary report (not reduplicated in this progress note). I personally obtained the chief complaint(s) and history of present illness.  I confirmed and edited as necessary the review of systems, past medical/surgical history, family history, social history, and examination findings as documented by others; and I examined the patient myself. I personally reviewed the relevant tests, images, and reports as documented above. I formulated and edited as necessary the assessment and plan and discussed the findings and management plan with the patient and family.  - Khadra Topete MD

## 2020-09-22 DIAGNOSIS — Z11.59 ENCOUNTER FOR SCREENING FOR OTHER VIRAL DISEASES: Primary | ICD-10-CM

## 2020-09-25 ENCOUNTER — TELEPHONE (OUTPATIENT)
Dept: GASTROENTEROLOGY | Facility: OUTPATIENT CENTER | Age: 80
End: 2020-09-25

## 2020-09-28 ENCOUNTER — TELEPHONE (OUTPATIENT)
Dept: GASTROENTEROLOGY | Facility: OUTPATIENT CENTER | Age: 80
End: 2020-09-28

## 2020-09-28 DIAGNOSIS — Z11.59 ENCOUNTER FOR SCREENING FOR OTHER VIRAL DISEASES: ICD-10-CM

## 2020-09-28 PROCEDURE — U0003 INFECTIOUS AGENT DETECTION BY NUCLEIC ACID (DNA OR RNA); SEVERE ACUTE RESPIRATORY SYNDROME CORONAVIRUS 2 (SARS-COV-2) (CORONAVIRUS DISEASE [COVID-19]), AMPLIFIED PROBE TECHNIQUE, MAKING USE OF HIGH THROUGHPUT TECHNOLOGIES AS DESCRIBED BY CMS-2020-01-R: HCPCS | Performed by: INTERNAL MEDICINE

## 2020-09-28 NOTE — TELEPHONE ENCOUNTER
Patient taking any blood thinners ? No      Heart disease ? Denies      Lung disease ? Denies      Sleep apnea ? Denies      Diabetic ? Denies      Kidney disease ?Denies      Electronic implanted medical devices ? Denies        PTSD ? N/a      Prep instructions reviewed with patient ? Patient declined review.  policy, MAC sedation plan reviewed. Instructed patient to have someone stay with her post exam.     Yes    Pharmacy :  [facility]     Indication for procedure : History of adenomatous polyp of colon     Referring provider : Dominic Hopkins MD      Arrival Time : 8 AM

## 2020-09-29 LAB
SARS-COV-2 RNA SPEC QL NAA+PROBE: NOT DETECTED
SPECIMEN SOURCE: NORMAL

## 2020-10-02 ENCOUNTER — DOCUMENTATION ONLY (OUTPATIENT)
Dept: GASTROENTEROLOGY | Facility: OUTPATIENT CENTER | Age: 80
End: 2020-10-02
Attending: FAMILY MEDICINE
Payer: MEDICARE

## 2020-10-02 ENCOUNTER — TRANSFERRED RECORDS (OUTPATIENT)
Dept: HEALTH INFORMATION MANAGEMENT | Facility: CLINIC | Age: 80
End: 2020-10-02

## 2020-10-05 ENCOUNTER — ALLIED HEALTH/NURSE VISIT (OUTPATIENT)
Dept: FAMILY MEDICINE | Facility: CLINIC | Age: 80
End: 2020-10-05
Payer: MEDICARE

## 2020-10-05 DIAGNOSIS — Z23 NEED FOR PROPHYLACTIC VACCINATION AND INOCULATION AGAINST INFLUENZA: Primary | ICD-10-CM

## 2020-10-05 LAB — COPATH REPORT: NORMAL

## 2020-10-05 NOTE — PROGRESS NOTES
"Injectable Influenza Immunization Documentation    1.  Has the patient received the information for the injectable influenza vaccine? YES     2. Is the patient 6 months of age or older? YES     3. Does the patient have any of the following contraindications?         Severe allergy to eggs? No     Severe allergic reaction to previous influenza vaccines? No   Severe allergy to latex? No       History of Guillain-Berkeley syndrome? No     Currently have a temperature greater than 100.4F? No        4.  Severely egg allergic patients should have flu vaccine eligibility assessed by an MD, RN, or pharmacist, and those who received flu vaccine should be observed for 15 min by an MD, RN, Pharmacist, Medical Technician, or member of clinic staff.\": YES    5. Latex-allergic patients should be given latex-free influenza vaccine Yes. Please reference the Vaccine latex table to determine if your clinic s product is latex-containing.       Vaccination given by Alina Wilson CMA,[facility]  October 5, 2020 9:44 AM        Johnna Hurtado comes into clinic today at the request of Dr. Hopkins Ordering Provider for Flu shot.    This service provided today was under the supervising provider of the day Dr. Hopkins, who was available if needed.    Alina Wilson CMA          "

## 2021-01-05 ENCOUNTER — TELEPHONE (OUTPATIENT)
Dept: FAMILY MEDICINE | Facility: CLINIC | Age: 81
End: 2021-01-05

## 2021-01-05 DIAGNOSIS — Z85.3 HISTORY OF BREAST CANCER: Primary | ICD-10-CM

## 2021-01-05 NOTE — TELEPHONE ENCOUNTER
[facility] Call Center    Phone Message    May a detailed message be left on voicemail: yes     Reason for Call: Order(s): Other: Diagnostic Mammogram  Reason for requested: Pt requested and she gets this done annually.  Date needed: Before March 1  Provider name: Dr Hopkins      Action Taken: Message routed to:  [place] [facility]: [facility]    Travel Screening: Not Applicable

## 2021-01-08 ENCOUNTER — ANCILLARY PROCEDURE (OUTPATIENT)
Dept: MAMMOGRAPHY | Facility: CLINIC | Age: 81
End: 2021-01-08
Attending: FAMILY MEDICINE
Payer: MEDICARE

## 2021-01-08 DIAGNOSIS — Z85.3 HISTORY OF BREAST CANCER: ICD-10-CM

## 2021-01-08 PROCEDURE — 77065 DX MAMMO INCL CAD UNI: CPT | Mod: RT | Performed by: RADIOLOGY

## 2021-01-08 PROCEDURE — G0279 TOMOSYNTHESIS, MAMMO: HCPCS | Performed by: RADIOLOGY

## 2021-03-04 ENCOUNTER — TELEPHONE (OUTPATIENT)
Dept: OPHTHALMOLOGY | Facility: CLINIC | Age: 81
End: 2021-03-04

## 2021-03-04 NOTE — TELEPHONE ENCOUNTER
Spoke to [facility] at 1153    Reviewed last updated Glasses Rx in system from 2016.    Recommend updating Rx and able to schedule exam here or may have performed at [facility] eye seemed satisified with information -- no schedule any [facility] needed at this time    Lavelle Almeida RN 11:56 AM 03/04/21          [facility] Call Center    Phone Message    May a detailed message be left on voicemail: yes     Reason for Call: Other:    Pt is at [facility] right now looking for eye glasses.  Pt had refraction done at his last visit with [facility] on 07/20/2020. Please fax eye glass rx over to [facility] fax #: [phone]      Action Taken: Other:  eye    Travel Screening: Not Applicable

## 2021-03-16 ENCOUNTER — OFFICE VISIT (OUTPATIENT)
Dept: FAMILY MEDICINE | Facility: CLINIC | Age: 81
End: 2021-03-16
Payer: MEDICARE

## 2021-03-16 VITALS
BODY MASS INDEX: 31.9 KG/M2 | SYSTOLIC BLOOD PRESSURE: 132 MMHG | HEIGHT: 60 IN | DIASTOLIC BLOOD PRESSURE: 85 MMHG | HEART RATE: 97 BPM | WEIGHT: 162.5 LBS | RESPIRATION RATE: 15 BRPM | OXYGEN SATURATION: 99 % | TEMPERATURE: 96.8 F

## 2021-03-16 DIAGNOSIS — Z00.00 MEDICARE ANNUAL WELLNESS VISIT, SUBSEQUENT: Primary | ICD-10-CM

## 2021-03-16 DIAGNOSIS — Z13.220 SCREENING FOR LIPID DISORDERS: ICD-10-CM

## 2021-03-16 LAB
BUN SERPL-MCNC: 19 MG/DL (ref 7–30)
CALCIUM SERPL-MCNC: 9.2 MG/DL (ref 8.5–10.4)
CHLORIDE SERPLBLD-SCNC: 107 MMOL/L (ref 94–109)
CHOLEST SERPL-MCNC: 197 MG/DL (ref 0–200)
CHOLEST/HDLC SERPL: 2.5 {RATIO} (ref 0–5)
CO2 SERPL-SCNC: 27 MMOL/L (ref 20–32)
CREAT SERPL-MCNC: 0.7 MG/DL (ref 0.6–1.3)
EGFR CALCULATED (BLACK REFERENCE): 103
EGFR CALCULATED (NON BLACK REFERENCE): 85.1
FASTING SPECIMEN: YES
GLUCOSE SERPL-MCNC: 99 MG/DL (ref 60–99)
HDLC SERPL-MCNC: 78 MG/DL
LDLC SERPL CALC-MCNC: 102 MG/DL (ref 0–129)
POTASSIUM SERPL-SCNC: 4.8 MMOL/L (ref 3.4–5.3)
SODIUM SERPL-SCNC: 144 MMOL/L (ref 137.3–146.3)
TRIGL SERPL-MCNC: 84 MG/DL (ref 0–150)
VLDL-CHOLESTEROL: 17 (ref 7–32)

## 2021-03-16 SDOH — HEALTH STABILITY: MENTAL HEALTH: HOW OFTEN DO YOU HAVE A DRINK CONTAINING ALCOHOL?: 2-3 TIMES A WEEK

## 2021-03-16 SDOH — HEALTH STABILITY: MENTAL HEALTH: HOW OFTEN DO YOU HAVE 6 OR MORE DRINKS ON ONE OCCASION?: NOT ASKED

## 2021-03-16 SDOH — HEALTH STABILITY: MENTAL HEALTH: HOW MANY STANDARD DRINKS CONTAINING ALCOHOL DO YOU HAVE ON A TYPICAL DAY?: 1 OR 2

## 2021-03-16 ASSESSMENT — MIFFLIN-ST. JEOR: SCORE: 1110.66

## 2021-03-16 NOTE — NURSING NOTE
"80 year old  Chief Complaint   Patient presents with     Physical     no other concerns        Blood pressure 132/85, pulse 97, temperature 96.8  F (36  C), temperature source Skin, resp. rate 15, height 1.511 m (4' 11.5\"), weight 73.7 kg (162 lb 8 oz), SpO2 99 %. Body mass index is 32.27 kg/m .  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     Pseudoexfoliation (PXF) glaucoma, moderate stage     History of deep venous thrombosis     Medicare annual wellness visit, subsequent     History of adenomatous polyp of colon     Nuclear senile cataract of both eyes       Wt Readings from Last 2 Encounters:   03/16/21 73.7 kg (162 lb 8 oz)   02/28/20 70.5 kg (155 lb 8 oz)     BP Readings from Last 3 Encounters:   03/16/21 132/85   02/28/20 121/78   02/04/20 113/79         Current Outpatient Medications   Medication     latanoprost (XALATAN) 0.005 % ophthalmic solution     latanoprost (XALATAN) 0.005 % ophthalmic solution     bisacodyl (DULCOLAX) 5 MG EC tablet     polyethylene glycol (GOLYTELY) 236 g suspension     UNABLE TO FIND     No current facility-administered medications for this visit.        Social History     Tobacco Use     Smoking status: Never Smoker     Smokeless tobacco: Never Used   Substance Use Topics     Alcohol use: Yes     Frequency: 2-3 times a week     Drinks per session: 1 or 2     Comment: a glass of wine 2-3 times a week     Drug use: No       Health Maintenance Due   Topic Date Due     FALL RISK ASSESSMENT  11/12/2020     MEDICARE ANNUAL WELLNESS VISIT  02/28/2021       No results found for: PAP      March 16, 2021 12:57 PM    "

## 2021-04-14 NOTE — PROGRESS NOTES
CHIEF COMPLAINT:  Medicare annual wellness visit, subsequent.      HISTORY OF PRESENT ILLNESS:  Johnna is an 80-year-old here for the above.  This will be her last visit with me.  She is permanently moving to [place], [place] to be near her son.  He is going to grad school there, lived there for years, moved back here and now decided to move there.  She has an older home and is clearing out her place.  She has bought a townhouse there and a new vehicle.  She will likely be moving there in the late summer or early fall.      She has received both of her COVID immunizations, the first one on 01/29 and second one on 02/19.  She did not get COVID infection early in the year and she had no problems tolerating the immunizations.      ACTIVE MEDICAL PROBLEMS:  Reviewed.      CURRENT MEDICATIONS:     1. Reviewed.        She is followed extremely closely by Ophthalmology given her various eye conditions.      SOCIAL, FAMILY AND PAST SURGICAL HISTORIES:  Unchanged or updated as noted above.      CURRENT MEDICATIONS:     1. All reviewed as indicated.      HEALTHCARE MAINTENANCE:  She will be seeing her ophthalmologist this week.  She wears glasses.  She uses eyedrops.  Eye care is very up-to-date and things are stable.  Her hearing is fine.  Dental care is up-to-date.  She is completely up-to-date with all immunizations with the exception of a Tdap that will be given some time in 2021.  If she desires this, she will get this at a pharmacy.  Blood pressure is 132/85.  Body mass index is 32.27.  Weight is up about 7 pounds and she knows it is up due to the pandemic.  Her last DEXA scan was a while ago.  She has osteoporosis and had a most negative and valid T score of -2.5.  However, she has absolutely no interest in going on specific therapy for this.  Therefore, we decided not to get another DEXA scan at this time.  She will continue with calcium and vitamin D and routine exercise.  She had a mammogram recently on 01/01/2021.   She is 80 years old and is up-to-date with colon cancer with no further colon cancer screening indicated.  We will go ahead and get a basic metabolic panel and a lipid panel today.      MEDICARE QUESTIONS:  No problems at all with activities of daily living.  She has had no falls at home in the last year.  No problems with ambulation.  No significant depression.  Finally, no memory loss concerns.      OBJECTIVE:  Johnna is in absolutely no distress.  Affect is very upbeat.  She is breathing comfortably.  She is alert and oriented x3.  /85 with a pulse of 97 and regular.  Temperature is 96.8.  She is 4 feet 11.5 inches in height and weighs 152 pounds 8 ounces with a BMI of 32.27.  O2 sats are 99% on room air.  HEENT:  Head is normocephalic, atraumatic.  Ears are free of any cerumen.  The TMs look fine.  No pain with palpation of the frontal or maxillary sinuses.  Eyes are grossly normal.  She is wearing a mask.  Neck is supple without any anterior or posterior chain adenopathy.  No visible or palpable thyromegaly.  No carotid bruits are appreciated.  Lungs are clear to auscultation bilaterally.  Heart reveals a regular rate and rhythm without murmur.  Ankles free of any edema.  Good peripheral pulses.      LABORATORY DATA:  Labs pending.      ASSESSMENT AND PLAN:   1. Medicare annual wellness visit, up-to-date with all HCM strategies. Receive Tdap here or in [place] in 2021.  She will get that from her pharmacy.  Otherwise, completely up-to-date with all immunizations.   2. Continue with annual or biannual mammograms.   3. Osteoporosis, but not desiring any treatment.  Therefore, subsequent DEXA scan will not be ordered at this time.   4. Follow up with Ophthalmology as she is doing and establish care in [place] when she arrives there.     5. Call with any problems or questions.

## 2021-10-23 ENCOUNTER — HEALTH MAINTENANCE LETTER (OUTPATIENT)
Age: 81
End: 2021-10-23

## 2022-06-04 ENCOUNTER — HEALTH MAINTENANCE LETTER (OUTPATIENT)
Age: 82
End: 2022-06-04

## 2022-10-09 ENCOUNTER — HEALTH MAINTENANCE LETTER (OUTPATIENT)
Age: 82
End: 2022-10-09

## 2023-06-10 ENCOUNTER — HEALTH MAINTENANCE LETTER (OUTPATIENT)
Age: 83
End: 2023-06-10